# Patient Record
Sex: FEMALE | Race: WHITE | NOT HISPANIC OR LATINO | Employment: FULL TIME | URBAN - METROPOLITAN AREA
[De-identification: names, ages, dates, MRNs, and addresses within clinical notes are randomized per-mention and may not be internally consistent; named-entity substitution may affect disease eponyms.]

---

## 2023-09-18 ENCOUNTER — TELEPHONE (OUTPATIENT)
Dept: PRIMARY CARE | Facility: CLINIC | Age: 68
End: 2023-09-18
Payer: COMMERCIAL

## 2023-09-18 DIAGNOSIS — U07.1 COVID-19: Primary | ICD-10-CM

## 2023-09-18 NOTE — TELEPHONE ENCOUNTER
Tested for COVID-19. Wishes to do paxlovid. She knows to hold crestor for 5 days after stopping med. She is aware of EUA and side effects of meds.  Call if worsening or not feeling better

## 2023-10-04 ENCOUNTER — PHARMACY VISIT (OUTPATIENT)
Dept: PHARMACY | Facility: CLINIC | Age: 68
End: 2023-10-04
Payer: COMMERCIAL

## 2023-10-04 PROCEDURE — RXMED WILLOW AMBULATORY MEDICATION CHARGE

## 2023-10-04 RX ORDER — LOSARTAN POTASSIUM 25 MG/1
25 TABLET ORAL 2 TIMES DAILY
Qty: 180 TABLET | Refills: 1 | OUTPATIENT
Start: 2022-12-08 | End: 2023-12-31 | Stop reason: SDUPTHER

## 2023-10-05 ENCOUNTER — PHARMACY VISIT (OUTPATIENT)
Dept: PHARMACY | Facility: CLINIC | Age: 68
End: 2023-10-05
Payer: COMMERCIAL

## 2023-10-05 PROCEDURE — RXMED WILLOW AMBULATORY MEDICATION CHARGE

## 2023-10-23 ENCOUNTER — PHARMACY VISIT (OUTPATIENT)
Dept: PHARMACY | Facility: CLINIC | Age: 68
End: 2023-10-23
Payer: COMMERCIAL

## 2023-10-23 PROCEDURE — RXMED WILLOW AMBULATORY MEDICATION CHARGE

## 2023-11-10 ENCOUNTER — PHARMACY VISIT (OUTPATIENT)
Dept: PHARMACY | Facility: CLINIC | Age: 68
End: 2023-11-10
Payer: COMMERCIAL

## 2023-11-10 ENCOUNTER — TELEPHONE (OUTPATIENT)
Dept: PRIMARY CARE | Facility: CLINIC | Age: 68
End: 2023-11-10
Payer: COMMERCIAL

## 2023-11-10 DIAGNOSIS — Z00.00 HEALTH CARE MAINTENANCE: Primary | ICD-10-CM

## 2023-11-10 PROBLEM — H52.203 ASTIGMATISM OF BOTH EYES: Status: ACTIVE | Noted: 2023-11-10

## 2023-11-10 PROBLEM — R09.81 NASAL CONGESTION: Status: ACTIVE | Noted: 2023-11-10

## 2023-11-10 PROBLEM — H35.033 HYPERTENSIVE RETINOPATHY OF BOTH EYES, GRADE 1: Status: ACTIVE | Noted: 2023-11-10

## 2023-11-10 PROBLEM — E55.9 VITAMIN D DEFICIENCY: Status: ACTIVE | Noted: 2023-11-10

## 2023-11-10 PROBLEM — J30.9 ALLERGIC RHINITIS: Status: ACTIVE | Noted: 2023-11-10

## 2023-11-10 PROBLEM — E78.5 DYSLIPIDEMIA: Status: ACTIVE | Noted: 2023-11-10

## 2023-11-10 PROBLEM — H43.811 POSTERIOR VITREOUS DETACHMENT, RIGHT EYE: Status: ACTIVE | Noted: 2023-11-10

## 2023-11-10 PROBLEM — M25.579 ANKLE PAIN: Status: ACTIVE | Noted: 2023-11-10

## 2023-11-10 PROBLEM — M79.89 MASS OF SOFT TISSUE OF THIGH: Status: ACTIVE | Noted: 2023-11-10

## 2023-11-10 PROBLEM — J02.9 SORE THROAT: Status: ACTIVE | Noted: 2023-11-10

## 2023-11-10 PROBLEM — H10.9 CONJUNCTIVITIS: Status: ACTIVE | Noted: 2023-11-10

## 2023-11-10 PROBLEM — M85.80 OSTEOPENIA: Status: ACTIVE | Noted: 2023-11-10

## 2023-11-10 PROBLEM — S82.831K: Status: ACTIVE | Noted: 2023-11-10

## 2023-11-10 PROBLEM — Z98.890 S/P YAG CAPSULOTOMY, RIGHT: Status: ACTIVE | Noted: 2023-11-10

## 2023-11-10 PROBLEM — R73.09 ELEVATED GLUCOSE: Status: ACTIVE | Noted: 2023-11-10

## 2023-11-10 PROBLEM — R07.9 CHEST PAIN: Status: ACTIVE | Noted: 2023-11-10

## 2023-11-10 PROBLEM — I10 ESSENTIAL HYPERTENSION: Status: ACTIVE | Noted: 2023-11-10

## 2023-11-10 PROBLEM — H25.812 COMBINED FORM OF AGE-RELATED CATARACT, LEFT EYE: Status: ACTIVE | Noted: 2023-11-10

## 2023-11-10 PROBLEM — H11.013: Status: ACTIVE | Noted: 2023-11-10

## 2023-11-10 PROBLEM — B35.1 ONYCHOMYCOSIS OF TOENAIL: Status: ACTIVE | Noted: 2023-11-10

## 2023-11-10 PROBLEM — M79.673 FOOT PAIN: Status: ACTIVE | Noted: 2023-11-10

## 2023-11-10 PROBLEM — B00.1 COLD SORE: Status: ACTIVE | Noted: 2023-11-10

## 2023-11-10 PROBLEM — N95.2 ATROPHIC VAGINITIS: Status: ACTIVE | Noted: 2023-11-10

## 2023-11-10 PROBLEM — B49 FUNGAL INFECTION: Status: ACTIVE | Noted: 2023-11-10

## 2023-11-10 PROBLEM — M79.10 MYALGIA: Status: ACTIVE | Noted: 2023-11-10

## 2023-11-10 PROBLEM — H04.129 DRY EYE: Status: ACTIVE | Noted: 2023-11-10

## 2023-11-10 PROBLEM — Z20.822 SUSPECTED 2019 NOVEL CORONAVIRUS INFECTION: Status: ACTIVE | Noted: 2023-11-10

## 2023-11-10 PROCEDURE — RXMED WILLOW AMBULATORY MEDICATION CHARGE

## 2023-11-10 RX ORDER — RESPIRATORY SYNCYTIAL VISUS VACCINE RECOMBINANT, ADJUVANTED 120MCG/0.5
0.5 KIT INTRAMUSCULAR ONCE
Qty: 0.5 ML | Refills: 0 | Status: SHIPPED | OUTPATIENT
Start: 2023-11-10 | End: 2023-11-10

## 2023-12-07 ENCOUNTER — OFFICE VISIT (OUTPATIENT)
Dept: OPHTHALMOLOGY | Facility: CLINIC | Age: 68
End: 2023-12-07
Payer: COMMERCIAL

## 2023-12-07 DIAGNOSIS — H25.812 COMBINED FORM OF AGE-RELATED CATARACT, LEFT EYE: ICD-10-CM

## 2023-12-07 DIAGNOSIS — H11.013: Primary | ICD-10-CM

## 2023-12-07 DIAGNOSIS — H52.4 PRESBYOPIA OF BOTH EYES: ICD-10-CM

## 2023-12-07 DIAGNOSIS — H52.221 REGULAR ASTIGMATISM OF RIGHT EYE: ICD-10-CM

## 2023-12-07 DIAGNOSIS — Z96.1 PSEUDOPHAKIA, RIGHT EYE: ICD-10-CM

## 2023-12-07 DIAGNOSIS — H43.811 POSTERIOR VITREOUS DETACHMENT, RIGHT EYE: ICD-10-CM

## 2023-12-07 DIAGNOSIS — H35.033 HYPERTENSIVE RETINOPATHY OF BOTH EYES, GRADE 1: ICD-10-CM

## 2023-12-07 DIAGNOSIS — H52.02 HYPEROPIA, LEFT: ICD-10-CM

## 2023-12-07 PROCEDURE — 92014 COMPRE OPH EXAM EST PT 1/>: CPT | Performed by: OPTOMETRIST

## 2023-12-07 PROCEDURE — 92015 DETERMINE REFRACTIVE STATE: CPT | Performed by: OPTOMETRIST

## 2023-12-07 RX ORDER — LYSINE HCL 500 MG
TABLET ORAL
COMMUNITY
Start: 2022-08-11

## 2023-12-07 RX ORDER — ESTRADIOL 10 UG/1
TABLET VAGINAL
COMMUNITY
Start: 2023-09-12 | End: 2024-02-01 | Stop reason: SDUPTHER

## 2023-12-07 RX ORDER — LEVOCETIRIZINE DIHYDROCHLORIDE 5 MG/1
1 TABLET, FILM COATED ORAL EVERY EVENING
COMMUNITY
Start: 2022-08-11

## 2023-12-07 ASSESSMENT — SLIT LAMP EXAM - LIDS
COMMENTS: NORMAL
COMMENTS: NORMAL

## 2023-12-07 ASSESSMENT — ENCOUNTER SYMPTOMS
ENDOCRINE NEGATIVE: 0
RESPIRATORY NEGATIVE: 0
MUSCULOSKELETAL NEGATIVE: 0
ALLERGIC/IMMUNOLOGIC NEGATIVE: 0
NEUROLOGICAL NEGATIVE: 0
HEMATOLOGIC/LYMPHATIC NEGATIVE: 0
EYES NEGATIVE: 1
GASTROINTESTINAL NEGATIVE: 0
PSYCHIATRIC NEGATIVE: 0
CARDIOVASCULAR NEGATIVE: 0
CONSTITUTIONAL NEGATIVE: 0

## 2023-12-07 ASSESSMENT — CONF VISUAL FIELD
OD_INFERIOR_TEMPORAL_RESTRICTION: 0
OS_SUPERIOR_TEMPORAL_RESTRICTION: 0
OD_SUPERIOR_TEMPORAL_RESTRICTION: 0
OD_SUPERIOR_NASAL_RESTRICTION: 0
OD_INFERIOR_NASAL_RESTRICTION: 0
OS_NORMAL: 1
METHOD: COUNTING FINGERS
OD_NORMAL: 1
OS_INFERIOR_TEMPORAL_RESTRICTION: 0
OS_INFERIOR_NASAL_RESTRICTION: 0
OS_SUPERIOR_NASAL_RESTRICTION: 0

## 2023-12-07 ASSESSMENT — REFRACTION_MANIFEST
OS_ADD: +2.50
OS_SPHERE: +1.50
OS_AXIS: 095
OD_CYLINDER: -0.75
OS_CYLINDER: -0.50
OD_AXIS: 140
OD_ADD: +2.50
OD_SPHERE: -0.25

## 2023-12-07 ASSESSMENT — EXTERNAL EXAM - RIGHT EYE: OD_EXAM: NORMAL

## 2023-12-07 ASSESSMENT — VISUAL ACUITY
OD_CC: 20/20
OS_CC: 20/30
OS_CC+: -1
CORRECTION_TYPE: GLASSES
OS_BAT_MED: 20/30-2
METHOD: SNELLEN - LINEAR

## 2023-12-07 ASSESSMENT — CUP TO DISC RATIO
OS_RATIO: 0.3
OD_RATIO: 0.3

## 2023-12-07 ASSESSMENT — REFRACTION_WEARINGRX
OD_ADD: +2.50
OD_CYLINDER: -0.75
OS_SPHERE: +0.75
OD_AXIS: 135
OS_ADD: +2.50
SPECS_TYPE: PAL
OD_SPHERE: -0.25
OS_CYLINDER: DS

## 2023-12-07 ASSESSMENT — PACHYMETRY
OS_CT(UM): 572
OD_CT(UM): 567

## 2023-12-07 ASSESSMENT — TONOMETRY
IOP_METHOD: GOLDMANN APPLANATION
OS_IOP_MMHG: 18
OD_IOP_MMHG: 18

## 2023-12-07 ASSESSMENT — EXTERNAL EXAM - LEFT EYE: OS_EXAM: NORMAL

## 2023-12-07 NOTE — PROGRESS NOTES
Longstanding PSC OS and VA 20/25 and stable as well as refraction stable.  Will monitor annually.  A spectacle prescription was given at the patient`s request. Minimal change  PVD OD and no changes.  Dx of HTN and grade I retinopathy.  Pterygium OD and stable. RTc annually or The patient was asked to return to our clinic or seek out eye care ASAP if new flashes of light or floaters are noted.

## 2023-12-31 DIAGNOSIS — E78.5 HYPERLIPIDEMIA, UNSPECIFIED HYPERLIPIDEMIA TYPE: ICD-10-CM

## 2023-12-31 DIAGNOSIS — I10 ESSENTIAL HYPERTENSION: Primary | ICD-10-CM

## 2024-01-02 ENCOUNTER — PHARMACY VISIT (OUTPATIENT)
Dept: PHARMACY | Facility: CLINIC | Age: 69
End: 2024-01-02
Payer: COMMERCIAL

## 2024-01-02 PROCEDURE — RXMED WILLOW AMBULATORY MEDICATION CHARGE

## 2024-01-02 RX ORDER — ROSUVASTATIN CALCIUM 5 MG/1
5 TABLET, COATED ORAL DAILY
Qty: 90 TABLET | Refills: 3 | Status: SHIPPED | OUTPATIENT
Start: 2024-01-02 | End: 2024-12-31

## 2024-01-02 RX ORDER — LOSARTAN POTASSIUM 25 MG/1
25 TABLET ORAL 2 TIMES DAILY
Qty: 180 TABLET | Refills: 3 | Status: SHIPPED | OUTPATIENT
Start: 2024-01-02

## 2024-01-03 ENCOUNTER — PHARMACY VISIT (OUTPATIENT)
Dept: PHARMACY | Facility: CLINIC | Age: 69
End: 2024-01-03

## 2024-01-25 ASSESSMENT — PROMIS GLOBAL HEALTH SCALE
EMOTIONAL_PROBLEMS: NEVER
RATE_MENTAL_HEALTH: EXCELLENT
RATE_PHYSICAL_HEALTH: VERY GOOD
CARRYOUT_SOCIAL_ACTIVITIES: EXCELLENT
CARRYOUT_PHYSICAL_ACTIVITIES: COMPLETELY
RATE_GENERAL_HEALTH: VERY GOOD
RATE_SOCIAL_SATISFACTION: VERY GOOD
RATE_AVERAGE_PAIN: 0
RATE_QUALITY_OF_LIFE: VERY GOOD

## 2024-02-01 ENCOUNTER — OFFICE VISIT (OUTPATIENT)
Dept: PRIMARY CARE | Facility: CLINIC | Age: 69
End: 2024-02-01
Payer: COMMERCIAL

## 2024-02-01 VITALS
TEMPERATURE: 97.5 F | WEIGHT: 157.2 LBS | DIASTOLIC BLOOD PRESSURE: 65 MMHG | BODY MASS INDEX: 26.84 KG/M2 | OXYGEN SATURATION: 97 % | HEART RATE: 99 BPM | HEIGHT: 64 IN | RESPIRATION RATE: 16 BRPM | SYSTOLIC BLOOD PRESSURE: 125 MMHG

## 2024-02-01 DIAGNOSIS — E55.9 VITAMIN D DEFICIENCY: ICD-10-CM

## 2024-02-01 DIAGNOSIS — N89.8 VAGINAL DRYNESS: ICD-10-CM

## 2024-02-01 DIAGNOSIS — R22.41 MASS OF LOWER EXTREMITY, RIGHT: ICD-10-CM

## 2024-02-01 DIAGNOSIS — I10 ESSENTIAL HYPERTENSION: ICD-10-CM

## 2024-02-01 DIAGNOSIS — Z00.00 HEALTHCARE MAINTENANCE: Primary | ICD-10-CM

## 2024-02-01 DIAGNOSIS — Z12.31 ENCOUNTER FOR SCREENING MAMMOGRAM FOR MALIGNANT NEOPLASM OF BREAST: ICD-10-CM

## 2024-02-01 DIAGNOSIS — R73.09 ELEVATED GLUCOSE: ICD-10-CM

## 2024-02-01 DIAGNOSIS — M85.80 OSTEOPENIA, UNSPECIFIED LOCATION: ICD-10-CM

## 2024-02-01 PROBLEM — H10.9 CONJUNCTIVITIS: Status: RESOLVED | Noted: 2023-11-10 | Resolved: 2024-02-01

## 2024-02-01 PROBLEM — J02.9 SORE THROAT: Status: RESOLVED | Noted: 2023-11-10 | Resolved: 2024-02-01

## 2024-02-01 PROBLEM — R09.81 NASAL CONGESTION: Status: RESOLVED | Noted: 2023-11-10 | Resolved: 2024-02-01

## 2024-02-01 PROBLEM — B49 FUNGAL INFECTION: Status: RESOLVED | Noted: 2023-11-10 | Resolved: 2024-02-01

## 2024-02-01 PROBLEM — M79.673 FOOT PAIN: Status: RESOLVED | Noted: 2023-11-10 | Resolved: 2024-02-01

## 2024-02-01 PROBLEM — Z20.822 SUSPECTED 2019 NOVEL CORONAVIRUS INFECTION: Status: RESOLVED | Noted: 2023-11-10 | Resolved: 2024-02-01

## 2024-02-01 PROBLEM — R07.9 CHEST PAIN: Status: RESOLVED | Noted: 2023-11-10 | Resolved: 2024-02-01

## 2024-02-01 PROBLEM — M25.579 ANKLE PAIN: Status: RESOLVED | Noted: 2023-11-10 | Resolved: 2024-02-01

## 2024-02-01 PROCEDURE — 1036F TOBACCO NON-USER: CPT | Performed by: INTERNAL MEDICINE

## 2024-02-01 PROCEDURE — 99397 PER PM REEVAL EST PAT 65+ YR: CPT | Performed by: INTERNAL MEDICINE

## 2024-02-01 PROCEDURE — 1126F AMNT PAIN NOTED NONE PRSNT: CPT | Performed by: INTERNAL MEDICINE

## 2024-02-01 PROCEDURE — 1160F RVW MEDS BY RX/DR IN RCRD: CPT | Performed by: INTERNAL MEDICINE

## 2024-02-01 PROCEDURE — 3078F DIAST BP <80 MM HG: CPT | Performed by: INTERNAL MEDICINE

## 2024-02-01 PROCEDURE — 3074F SYST BP LT 130 MM HG: CPT | Performed by: INTERNAL MEDICINE

## 2024-02-01 PROCEDURE — 1159F MED LIST DOCD IN RCRD: CPT | Performed by: INTERNAL MEDICINE

## 2024-02-01 RX ORDER — ESTRADIOL 10 UG/1
TABLET VAGINAL
Qty: 12 TABLET | Refills: 3 | Status: SHIPPED | OUTPATIENT
Start: 2024-02-01 | End: 2024-03-11

## 2024-02-01 ASSESSMENT — ENCOUNTER SYMPTOMS
EYE PAIN: 0
CHILLS: 0
NERVOUS/ANXIOUS: 0
WOUND: 0
POLYDIPSIA: 0
CHEST TIGHTNESS: 0
POLYPHAGIA: 0
FEVER: 0
WHEEZING: 0
DIARRHEA: 0
DYSURIA: 0
VOMITING: 0
FREQUENCY: 0
PALPITATIONS: 0
NAUSEA: 0
HEMATURIA: 0
MYALGIAS: 0
COUGH: 0
UNEXPECTED WEIGHT CHANGE: 0
BLOOD IN STOOL: 0
RHINORRHEA: 0
ARTHRALGIAS: 0
DYSPHORIC MOOD: 0
CONSTIPATION: 0
SORE THROAT: 0
SHORTNESS OF BREATH: 0
HEADACHES: 0
ABDOMINAL PAIN: 0
DIZZINESS: 0

## 2024-02-01 ASSESSMENT — PATIENT HEALTH QUESTIONNAIRE - PHQ9
SUM OF ALL RESPONSES TO PHQ9 QUESTIONS 1 AND 2: 0
1. LITTLE INTEREST OR PLEASURE IN DOING THINGS: NOT AT ALL
2. FEELING DOWN, DEPRESSED OR HOPELESS: NOT AT ALL

## 2024-02-01 NOTE — PROGRESS NOTES
"Subjective   Patient ID: Lary Stein is a 69 y.o. female who presents for Annual Exam.    HPI     Here for annual  Sees dentist and eye doc regularly  Exercises  Watches diet  Recovered from fracture  Sees derm annually for skin checks  Due for labs  She has lump on right buttock x 10 months. Not getting bigger. Would like ultrasound. Saw gen surgery but does not want removed if does not have to    Had COVID and all recovered    Review of Systems   Constitutional:  Negative for chills, fever and unexpected weight change.   HENT:  Negative for congestion, hearing loss, rhinorrhea and sore throat.    Eyes:  Negative for pain and visual disturbance.   Respiratory:  Negative for cough, chest tightness, shortness of breath and wheezing.    Cardiovascular:  Negative for chest pain and palpitations.   Gastrointestinal:  Negative for abdominal pain, blood in stool, constipation, diarrhea, nausea and vomiting.   Endocrine: Negative for cold intolerance, heat intolerance, polydipsia and polyphagia.   Genitourinary:  Negative for dysuria, frequency and hematuria.   Musculoskeletal:  Negative for arthralgias and myalgias.   Skin:  Negative for rash and wound.   Neurological:  Negative for dizziness, syncope and headaches.   Psychiatric/Behavioral:  Negative for dysphoric mood. The patient is not nervous/anxious.        Objective   /65 Comment: home monitor  Pulse 99   Temp 36.4 °C (97.5 °F)   Resp 16   Ht 1.613 m (5' 3.5\")   Wt 71.3 kg (157 lb 3.2 oz)   SpO2 97%   BMI 27.41 kg/m²     Physical Exam  Vitals reviewed.   Constitutional:       Appearance: Normal appearance. She is not ill-appearing.   HENT:      Head: Normocephalic and atraumatic.      Right Ear: Tympanic membrane normal.      Left Ear: Tympanic membrane normal.      Nose: Nose normal.      Mouth/Throat:      Mouth: Mucous membranes are moist.      Pharynx: Oropharynx is clear.   Eyes:      Extraocular Movements: Extraocular movements intact.      " Conjunctiva/sclera: Conjunctivae normal.      Pupils: Pupils are equal, round, and reactive to light.   Cardiovascular:      Rate and Rhythm: Normal rate and regular rhythm.   Pulmonary:      Effort: Pulmonary effort is normal.      Breath sounds: Normal breath sounds. No wheezing.   Abdominal:      General: There is no distension.      Palpations: Abdomen is soft. There is no mass.      Tenderness: There is no abdominal tenderness.   Musculoskeletal:         General: No swelling.      Cervical back: Neck supple.      Comments: Right buttock- golf sized smooth round lump   Lymphadenopathy:      Cervical: No cervical adenopathy.   Neurological:      General: No focal deficit present.      Mental Status: She is alert and oriented to person, place, and time.      Gait: Gait normal.   Psychiatric:         Mood and Affect: Mood normal.         Behavior: Behavior normal.         Thought Content: Thought content normal.         Assessment/Plan   Problem List Items Addressed This Visit             ICD-10-CM    Elevated glucose R73.09    Relevant Orders    Hemoglobin A1C    Essential hypertension I10    Osteopenia M85.80    Relevant Orders    XR DEXA bone density    Vitamin D deficiency E55.9    Relevant Orders    Vitamin D 25-Hydroxy,Total (for eval of Vitamin D levels)     Other Visit Diagnoses         Codes    Healthcare maintenance    -  Primary Z00.00    Relevant Orders    CBC    Comprehensive Metabolic Panel    Lipid Panel    Encounter for screening mammogram for malignant neoplasm of breast     Z12.31    Relevant Orders    BI mammo bilateral screening tomosynthesis    Vaginal dryness     N89.8    Relevant Medications    Yuvafem 10 mcg tablet vaginal tablet    Mass of lower extremity, right     R22.41    Relevant Orders    US MSK lower extremity joints tendons muscles             CPE completed.  Advised to keep a heart healthy, low fat diet with fruits and veggies like Mediterranean diet.  Advised on the importance of  exercise and maintaining 150 minutes of exercise per week (30 minutes per day 5 days a week).  Advised on regular eye and dental visits.  Discussed age appropriate cancer screening, immunizations and recommendations given.  Discussed avoiding illicit drugs and tobacco. Advised on appropriate use of alcohol.  Advised to wear seat belt.    Right buttock mass- check ultrasound     HTN: controlled on Losartan  -has white coat, brought log     Fracture of right fibula: seeing ortho  -treated for osteopenia due to this     ELevated glucose: a1c 6.4, watch sugars  -strong fam hx     Hyperlipidemia: on crestor and controlled     Osteopenia: 6 months forteo, on Risedronate    Check labs, dexa in June and mammo. Send for ultrasound     Health Maintenance  -Pap smear: normal, >65, no longer needs  -Vaccinations: UTD COVID, shingrix, tdap, flu, prevnar-20  -Mammogram: 2/23, ordered  -Colonoscopy : 3/21-10 years  -DEXA: 6/22-worsening osteopenia

## 2024-02-08 ENCOUNTER — PATIENT MESSAGE (OUTPATIENT)
Dept: PRIMARY CARE | Facility: CLINIC | Age: 69
End: 2024-02-08
Payer: COMMERCIAL

## 2024-02-08 ENCOUNTER — APPOINTMENT (OUTPATIENT)
Dept: RADIOLOGY | Facility: HOSPITAL | Age: 69
End: 2024-02-08
Payer: COMMERCIAL

## 2024-02-08 DIAGNOSIS — R53.83 OTHER FATIGUE: ICD-10-CM

## 2024-02-08 DIAGNOSIS — Z00.00 HEALTHCARE MAINTENANCE: ICD-10-CM

## 2024-02-08 DIAGNOSIS — R22.41 MASS OF LOWER EXTREMITY, RIGHT: Primary | ICD-10-CM

## 2024-02-12 ENCOUNTER — PHARMACY VISIT (OUTPATIENT)
Dept: PHARMACY | Facility: CLINIC | Age: 69
End: 2024-02-12
Payer: COMMERCIAL

## 2024-02-12 PROCEDURE — RXMED WILLOW AMBULATORY MEDICATION CHARGE

## 2024-02-15 ENCOUNTER — HOSPITAL ENCOUNTER (OUTPATIENT)
Dept: RADIOLOGY | Facility: HOSPITAL | Age: 69
Discharge: HOME | End: 2024-02-15
Payer: COMMERCIAL

## 2024-02-15 DIAGNOSIS — R22.41 MASS OF LOWER EXTREMITY, RIGHT: ICD-10-CM

## 2024-02-15 PROCEDURE — 76881 US COMPL JOINT R-T W/IMG: CPT

## 2024-02-29 ENCOUNTER — HOSPITAL ENCOUNTER (OUTPATIENT)
Dept: RADIOLOGY | Facility: CLINIC | Age: 69
Discharge: HOME | End: 2024-02-29
Payer: COMMERCIAL

## 2024-02-29 ENCOUNTER — LAB (OUTPATIENT)
Dept: LAB | Facility: LAB | Age: 69
End: 2024-02-29
Payer: COMMERCIAL

## 2024-02-29 VITALS — BODY MASS INDEX: 26.84 KG/M2 | HEIGHT: 64 IN | WEIGHT: 157.19 LBS

## 2024-02-29 DIAGNOSIS — R73.09 ELEVATED GLUCOSE: ICD-10-CM

## 2024-02-29 DIAGNOSIS — Z00.00 HEALTHCARE MAINTENANCE: ICD-10-CM

## 2024-02-29 DIAGNOSIS — E55.9 VITAMIN D DEFICIENCY: ICD-10-CM

## 2024-02-29 DIAGNOSIS — R53.83 OTHER FATIGUE: ICD-10-CM

## 2024-02-29 DIAGNOSIS — Z12.31 ENCOUNTER FOR SCREENING MAMMOGRAM FOR MALIGNANT NEOPLASM OF BREAST: ICD-10-CM

## 2024-02-29 LAB
25(OH)D3 SERPL-MCNC: 53 NG/ML (ref 30–100)
ALBUMIN SERPL BCP-MCNC: 4.4 G/DL (ref 3.4–5)
ALP SERPL-CCNC: 55 U/L (ref 33–136)
ALT SERPL W P-5'-P-CCNC: 16 U/L (ref 7–45)
ANION GAP SERPL CALC-SCNC: 12 MMOL/L (ref 10–20)
AST SERPL W P-5'-P-CCNC: 18 U/L (ref 9–39)
BILIRUB SERPL-MCNC: 0.6 MG/DL (ref 0–1.2)
BUN SERPL-MCNC: 12 MG/DL (ref 6–23)
CALCIUM SERPL-MCNC: 9.6 MG/DL (ref 8.6–10.3)
CHLORIDE SERPL-SCNC: 106 MMOL/L (ref 98–107)
CHOLEST SERPL-MCNC: 177 MG/DL (ref 0–199)
CHOLESTEROL/HDL RATIO: 3.1
CO2 SERPL-SCNC: 28 MMOL/L (ref 21–32)
CREAT SERPL-MCNC: 0.87 MG/DL (ref 0.5–1.05)
EGFRCR SERPLBLD CKD-EPI 2021: 73 ML/MIN/1.73M*2
ERYTHROCYTE [DISTWIDTH] IN BLOOD BY AUTOMATED COUNT: 13.5 % (ref 11.5–14.5)
EST. AVERAGE GLUCOSE BLD GHB EST-MCNC: 134 MG/DL
GLUCOSE SERPL-MCNC: 111 MG/DL (ref 74–99)
HBA1C MFR BLD: 6.3 %
HCT VFR BLD AUTO: 42.6 % (ref 36–46)
HDLC SERPL-MCNC: 57.1 MG/DL
HGB BLD-MCNC: 14.1 G/DL (ref 12–16)
LDLC SERPL CALC-MCNC: 87 MG/DL
MCH RBC QN AUTO: 30.6 PG (ref 26–34)
MCHC RBC AUTO-ENTMCNC: 33.1 G/DL (ref 32–36)
MCV RBC AUTO: 92 FL (ref 80–100)
NON HDL CHOLESTEROL: 120 MG/DL (ref 0–149)
NRBC BLD-RTO: 0 /100 WBCS (ref 0–0)
PLATELET # BLD AUTO: 222 X10*3/UL (ref 150–450)
POTASSIUM SERPL-SCNC: 4.5 MMOL/L (ref 3.5–5.3)
PROT SERPL-MCNC: 6.8 G/DL (ref 6.4–8.2)
RBC # BLD AUTO: 4.61 X10*6/UL (ref 4–5.2)
SODIUM SERPL-SCNC: 141 MMOL/L (ref 136–145)
TRIGL SERPL-MCNC: 166 MG/DL (ref 0–149)
TSH SERPL-ACNC: 1.32 MIU/L (ref 0.44–3.98)
VLDL: 33 MG/DL (ref 0–40)
WBC # BLD AUTO: 4.7 X10*3/UL (ref 4.4–11.3)

## 2024-02-29 PROCEDURE — 36415 COLL VENOUS BLD VENIPUNCTURE: CPT

## 2024-02-29 PROCEDURE — 80061 LIPID PANEL: CPT

## 2024-02-29 PROCEDURE — 85027 COMPLETE CBC AUTOMATED: CPT

## 2024-02-29 PROCEDURE — 77063 BREAST TOMOSYNTHESIS BI: CPT | Performed by: RADIOLOGY

## 2024-02-29 PROCEDURE — 84443 ASSAY THYROID STIM HORMONE: CPT

## 2024-02-29 PROCEDURE — 82306 VITAMIN D 25 HYDROXY: CPT

## 2024-02-29 PROCEDURE — 77067 SCR MAMMO BI INCL CAD: CPT

## 2024-02-29 PROCEDURE — 83036 HEMOGLOBIN GLYCOSYLATED A1C: CPT

## 2024-02-29 PROCEDURE — 77067 SCR MAMMO BI INCL CAD: CPT | Performed by: RADIOLOGY

## 2024-02-29 PROCEDURE — 80053 COMPREHEN METABOLIC PANEL: CPT

## 2024-03-10 DIAGNOSIS — N89.8 VAGINAL DRYNESS: ICD-10-CM

## 2024-03-11 RX ORDER — ESTRADIOL 10 UG/1
TABLET VAGINAL
Qty: 12 TABLET | Refills: 2 | Status: SHIPPED | OUTPATIENT
Start: 2024-03-11

## 2024-03-30 DIAGNOSIS — M85.80 OSTEOPENIA, UNSPECIFIED LOCATION: Primary | ICD-10-CM

## 2024-03-30 PROCEDURE — RXMED WILLOW AMBULATORY MEDICATION CHARGE

## 2024-04-01 ENCOUNTER — PHARMACY VISIT (OUTPATIENT)
Dept: PHARMACY | Facility: CLINIC | Age: 69
End: 2024-04-01
Payer: COMMERCIAL

## 2024-04-01 PROCEDURE — RXMED WILLOW AMBULATORY MEDICATION CHARGE

## 2024-04-01 RX ORDER — RISEDRONATE SODIUM 150 MG/1
TABLET, FILM COATED ORAL
Qty: 9 TABLET | Refills: 3 | Status: SHIPPED | OUTPATIENT
Start: 2024-04-01 | End: 2025-03-31

## 2024-04-02 ENCOUNTER — PHARMACY VISIT (OUTPATIENT)
Dept: PHARMACY | Facility: CLINIC | Age: 69
End: 2024-04-02
Payer: COMMERCIAL

## 2024-04-13 ENCOUNTER — PHARMACY VISIT (OUTPATIENT)
Dept: PHARMACY | Facility: CLINIC | Age: 69
End: 2024-04-13
Payer: COMMERCIAL

## 2024-04-13 PROCEDURE — RXMED WILLOW AMBULATORY MEDICATION CHARGE

## 2024-05-02 PROCEDURE — 88305 TISSUE EXAM BY PATHOLOGIST: CPT | Performed by: DERMATOLOGY

## 2024-05-02 PROCEDURE — 88342 IMHCHEM/IMCYTCHM 1ST ANTB: CPT | Performed by: DERMATOLOGY

## 2024-05-07 ENCOUNTER — LAB REQUISITION (OUTPATIENT)
Dept: DERMATOPATHOLOGY | Facility: CLINIC | Age: 69
End: 2024-05-07
Payer: COMMERCIAL

## 2024-05-07 DIAGNOSIS — L98.8 OTHER SPECIFIED DISORDERS OF THE SKIN AND SUBCUTANEOUS TISSUE: ICD-10-CM

## 2024-05-16 LAB
LAB AP ASR DISCLAIMER: NORMAL
LABORATORY COMMENT REPORT: NORMAL
PATH REPORT.FINAL DX SPEC: NORMAL
PATH REPORT.GROSS SPEC: NORMAL
PATH REPORT.RELEVANT HX SPEC: NORMAL
PATH REPORT.TOTAL CANCER: NORMAL

## 2024-07-07 ASSESSMENT — LIFESTYLE VARIABLES
CURRENT_SMOKER: N
3_OR_MORE_DRINKS_PER_DAY: N

## 2024-07-09 ENCOUNTER — PHARMACY VISIT (OUTPATIENT)
Dept: PHARMACY | Facility: CLINIC | Age: 69
End: 2024-07-09
Payer: COMMERCIAL

## 2024-07-09 PROCEDURE — RXMED WILLOW AMBULATORY MEDICATION CHARGE

## 2024-07-11 ENCOUNTER — HOSPITAL ENCOUNTER (OUTPATIENT)
Dept: RADIOLOGY | Facility: CLINIC | Age: 69
Discharge: HOME | End: 2024-07-11
Payer: COMMERCIAL

## 2024-07-11 DIAGNOSIS — M85.80 OSTEOPENIA, UNSPECIFIED LOCATION: ICD-10-CM

## 2024-07-11 PROCEDURE — 77080 DXA BONE DENSITY AXIAL: CPT

## 2024-07-12 PROCEDURE — RXMED WILLOW AMBULATORY MEDICATION CHARGE

## 2024-07-15 ENCOUNTER — PHARMACY VISIT (OUTPATIENT)
Dept: PHARMACY | Facility: CLINIC | Age: 69
End: 2024-07-15
Payer: COMMERCIAL

## 2024-10-03 DIAGNOSIS — B00.1 COLD SORE: Primary | ICD-10-CM

## 2024-10-03 RX ORDER — VALACYCLOVIR HYDROCHLORIDE 1 G/1
2000 TABLET, FILM COATED ORAL 2 TIMES DAILY
Qty: 4 TABLET | Refills: 0 | Status: SHIPPED | OUTPATIENT
Start: 2024-10-03 | End: 2024-10-04

## 2024-10-06 PROCEDURE — RXMED WILLOW AMBULATORY MEDICATION CHARGE

## 2024-10-07 ENCOUNTER — PHARMACY VISIT (OUTPATIENT)
Dept: PHARMACY | Facility: CLINIC | Age: 69
End: 2024-10-07
Payer: COMMERCIAL

## 2024-10-10 ENCOUNTER — PHARMACY VISIT (OUTPATIENT)
Dept: PHARMACY | Facility: CLINIC | Age: 69
End: 2024-10-10
Payer: COMMERCIAL

## 2024-11-07 ENCOUNTER — APPOINTMENT (OUTPATIENT)
Dept: OPHTHALMOLOGY | Facility: CLINIC | Age: 69
End: 2024-11-07
Payer: COMMERCIAL

## 2024-11-16 ENCOUNTER — APPOINTMENT (OUTPATIENT)
Dept: OPHTHALMOLOGY | Facility: CLINIC | Age: 69
End: 2024-11-16
Payer: COMMERCIAL

## 2024-11-16 DIAGNOSIS — H52.203 ASTIGMATISM OF BOTH EYES WITH PRESBYOPIA: Primary | ICD-10-CM

## 2024-11-16 DIAGNOSIS — H52.4 ASTIGMATISM OF BOTH EYES WITH PRESBYOPIA: Primary | ICD-10-CM

## 2024-11-16 PROCEDURE — 92015 DETERMINE REFRACTIVE STATE: CPT | Performed by: OPTOMETRIST

## 2024-11-16 PROCEDURE — 92014 COMPRE OPH EXAM EST PT 1/>: CPT | Performed by: OPTOMETRIST

## 2024-11-16 ASSESSMENT — CONF VISUAL FIELD
OD_SUPERIOR_NASAL_RESTRICTION: 0
OD_INFERIOR_NASAL_RESTRICTION: 0
OS_SUPERIOR_TEMPORAL_RESTRICTION: 0
OS_SUPERIOR_NASAL_RESTRICTION: 0
OS_INFERIOR_TEMPORAL_RESTRICTION: 0
OD_INFERIOR_TEMPORAL_RESTRICTION: 0
OD_SUPERIOR_TEMPORAL_RESTRICTION: 0
OS_NORMAL: 1
OS_INFERIOR_NASAL_RESTRICTION: 0
OD_NORMAL: 1

## 2024-11-16 ASSESSMENT — TONOMETRY
OD_IOP_MMHG: 18
IOP_METHOD: GOLDMANN APPLANATION
OS_IOP_MMHG: 18

## 2024-11-16 ASSESSMENT — ENCOUNTER SYMPTOMS
EYES NEGATIVE: 1
CARDIOVASCULAR NEGATIVE: 0
GASTROINTESTINAL NEGATIVE: 0
CONSTITUTIONAL NEGATIVE: 0
MUSCULOSKELETAL NEGATIVE: 0
PSYCHIATRIC NEGATIVE: 0
HEMATOLOGIC/LYMPHATIC NEGATIVE: 0
RESPIRATORY NEGATIVE: 0
ENDOCRINE NEGATIVE: 0
ALLERGIC/IMMUNOLOGIC NEGATIVE: 0
NEUROLOGICAL NEGATIVE: 0

## 2024-11-16 ASSESSMENT — REFRACTION_MANIFEST
OS_ADD: +2.50
OS_AXIS: 095
OS_CYLINDER: -0.75
OD_AXIS: 155
OD_ADD: +2.50
OD_CYLINDER: -0.75
OS_SPHERE: +1.50
OD_SPHERE: PLANO

## 2024-11-16 ASSESSMENT — VISUAL ACUITY
OD_CC: 20/20
OD_CC: J1+
OS_CC: 20/25
CORRECTION_TYPE: GLASSES
METHOD: SNELLEN - LINEAR
OS_CC: J1+

## 2024-11-16 ASSESSMENT — REFRACTION_WEARINGRX
OS_ADD: +2.50
OD_CYLINDER: -0.75
OS_SPHERE: +1.50
OD_ADD: +2.50
OD_AXIS: 140
OS_AXIS: 095
OS_CYLINDER: -0.50
OD_SPHERE: -0.25

## 2024-11-16 ASSESSMENT — PACHYMETRY
OD_CT(UM): 567
OS_CT(UM): 572

## 2024-11-16 ASSESSMENT — CUP TO DISC RATIO
OS_RATIO: 0.3
OD_RATIO: 0.3

## 2024-11-16 ASSESSMENT — EXTERNAL EXAM - RIGHT EYE: OD_EXAM: NORMAL

## 2024-11-16 ASSESSMENT — EXTERNAL EXAM - LEFT EYE: OS_EXAM: NORMAL

## 2024-11-16 ASSESSMENT — SLIT LAMP EXAM - LIDS
COMMENTS: NORMAL
COMMENTS: NORMAL

## 2024-12-01 DIAGNOSIS — N89.8 VAGINAL DRYNESS: ICD-10-CM

## 2024-12-02 RX ORDER — ESTRADIOL 10 UG/1
TABLET VAGINAL
Qty: 12 TABLET | Refills: 2 | Status: SHIPPED | OUTPATIENT
Start: 2024-12-02

## 2025-02-13 ENCOUNTER — APPOINTMENT (OUTPATIENT)
Dept: PRIMARY CARE | Facility: CLINIC | Age: 70
End: 2025-02-13
Payer: COMMERCIAL

## 2025-02-13 VITALS
HEIGHT: 63 IN | DIASTOLIC BLOOD PRESSURE: 68 MMHG | SYSTOLIC BLOOD PRESSURE: 110 MMHG | HEART RATE: 80 BPM | WEIGHT: 157.2 LBS | BODY MASS INDEX: 27.85 KG/M2 | OXYGEN SATURATION: 98 %

## 2025-02-13 DIAGNOSIS — Z12.31 ENCOUNTER FOR SCREENING MAMMOGRAM FOR MALIGNANT NEOPLASM OF BREAST: ICD-10-CM

## 2025-02-13 DIAGNOSIS — E55.9 VITAMIN D DEFICIENCY: ICD-10-CM

## 2025-02-13 DIAGNOSIS — I10 ESSENTIAL HYPERTENSION: ICD-10-CM

## 2025-02-13 DIAGNOSIS — R73.09 ELEVATED GLUCOSE: ICD-10-CM

## 2025-02-13 DIAGNOSIS — N89.8 VAGINAL DRYNESS: ICD-10-CM

## 2025-02-13 DIAGNOSIS — M85.80 OSTEOPENIA, UNSPECIFIED LOCATION: ICD-10-CM

## 2025-02-13 DIAGNOSIS — Z00.00 HEALTHCARE MAINTENANCE: Primary | ICD-10-CM

## 2025-02-13 DIAGNOSIS — E78.5 HYPERLIPIDEMIA, UNSPECIFIED HYPERLIPIDEMIA TYPE: ICD-10-CM

## 2025-02-13 PROCEDURE — 3078F DIAST BP <80 MM HG: CPT | Performed by: INTERNAL MEDICINE

## 2025-02-13 PROCEDURE — 99397 PER PM REEVAL EST PAT 65+ YR: CPT | Performed by: INTERNAL MEDICINE

## 2025-02-13 PROCEDURE — 1160F RVW MEDS BY RX/DR IN RCRD: CPT | Performed by: INTERNAL MEDICINE

## 2025-02-13 PROCEDURE — 1036F TOBACCO NON-USER: CPT | Performed by: INTERNAL MEDICINE

## 2025-02-13 PROCEDURE — 3008F BODY MASS INDEX DOCD: CPT | Performed by: INTERNAL MEDICINE

## 2025-02-13 PROCEDURE — 1159F MED LIST DOCD IN RCRD: CPT | Performed by: INTERNAL MEDICINE

## 2025-02-13 PROCEDURE — RXMED WILLOW AMBULATORY MEDICATION CHARGE

## 2025-02-13 PROCEDURE — 3074F SYST BP LT 130 MM HG: CPT | Performed by: INTERNAL MEDICINE

## 2025-02-13 RX ORDER — ESTRADIOL 10 UG/1
INSERT VAGINAL
Qty: 12 TABLET | Refills: 2 | Status: SHIPPED | OUTPATIENT
Start: 2025-02-13

## 2025-02-13 RX ORDER — ROSUVASTATIN CALCIUM 5 MG/1
5 TABLET, COATED ORAL DAILY
Qty: 90 TABLET | Refills: 3 | Status: SHIPPED | OUTPATIENT
Start: 2025-02-13 | End: 2026-02-12

## 2025-02-13 RX ORDER — RISEDRONATE SODIUM 150 MG/1
150 TABLET, FILM COATED ORAL
Qty: 9 TABLET | Refills: 3 | Status: SHIPPED | OUTPATIENT
Start: 2025-02-13 | End: 2026-02-12

## 2025-02-13 RX ORDER — LOSARTAN POTASSIUM 25 MG/1
25 TABLET ORAL 2 TIMES DAILY
Qty: 180 TABLET | Refills: 3 | Status: SHIPPED | OUTPATIENT
Start: 2025-02-13

## 2025-02-13 ASSESSMENT — ENCOUNTER SYMPTOMS
NAUSEA: 0
HEMATURIA: 0
CHEST TIGHTNESS: 0
NERVOUS/ANXIOUS: 0
FREQUENCY: 0
DIZZINESS: 0
HEADACHES: 0
WHEEZING: 0
SHORTNESS OF BREATH: 0
COUGH: 0
RHINORRHEA: 0
ARTHRALGIAS: 0
FEVER: 0
PALPITATIONS: 0
CONSTIPATION: 0
POLYPHAGIA: 0
DIARRHEA: 0
UNEXPECTED WEIGHT CHANGE: 0
SORE THROAT: 0
ABDOMINAL PAIN: 0
POLYDIPSIA: 0
MYALGIAS: 0
CHILLS: 0
EYE PAIN: 0
VOMITING: 0
DYSPHORIC MOOD: 0
BLOOD IN STOOL: 0
DYSURIA: 0
WOUND: 0

## 2025-02-13 ASSESSMENT — PATIENT HEALTH QUESTIONNAIRE - PHQ9
1. LITTLE INTEREST OR PLEASURE IN DOING THINGS: NOT AT ALL
SUM OF ALL RESPONSES TO PHQ9 QUESTIONS 1 AND 2: 0
2. FEELING DOWN, DEPRESSED OR HOPELESS: NOT AT ALL

## 2025-02-13 NOTE — PROGRESS NOTES
"Subjective   Patient ID: Lary Stein is a 70 y.o. female who presents for Annual Exam.    HPI     Dental visits- UTD  Eye visits-UTD  Exercise- 3 days a week- bikes/treadmill and weights  Diet-healthy    Gets skins checks    States does not always need Losartan bid  BP this AM was 110/68    She overall feels good  Ankle does not bother her    Review of Systems   Constitutional:  Negative for chills, fever and unexpected weight change.   HENT:  Negative for congestion, hearing loss, rhinorrhea and sore throat.    Eyes:  Negative for pain and visual disturbance.   Respiratory:  Negative for cough, chest tightness, shortness of breath and wheezing.    Cardiovascular:  Negative for chest pain and palpitations.   Gastrointestinal:  Negative for abdominal pain, blood in stool, constipation, diarrhea, nausea and vomiting.   Endocrine: Negative for cold intolerance, heat intolerance, polydipsia and polyphagia.   Genitourinary:  Negative for dysuria, frequency and hematuria.   Musculoskeletal:  Negative for arthralgias and myalgias.   Skin:  Negative for rash and wound.   Neurological:  Negative for dizziness, syncope and headaches.   Psychiatric/Behavioral:  Negative for dysphoric mood. The patient is not nervous/anxious.        Objective   /68   Pulse 80   Ht 1.607 m (5' 3.25\")   Wt 71.3 kg (157 lb 3.2 oz)   SpO2 98%   BMI 27.63 kg/m²     Physical Exam  Vitals reviewed.   Constitutional:       Appearance: Normal appearance. She is not ill-appearing.   HENT:      Head: Normocephalic and atraumatic.      Right Ear: Tympanic membrane normal.      Left Ear: Tympanic membrane normal.      Nose: Nose normal.      Mouth/Throat:      Mouth: Mucous membranes are moist.      Pharynx: Oropharynx is clear.   Eyes:      Extraocular Movements: Extraocular movements intact.      Conjunctiva/sclera: Conjunctivae normal.      Pupils: Pupils are equal, round, and reactive to light.   Cardiovascular:      Rate and Rhythm: " Normal rate and regular rhythm.   Pulmonary:      Effort: Pulmonary effort is normal.      Breath sounds: Normal breath sounds. No wheezing.   Abdominal:      General: There is no distension.      Palpations: Abdomen is soft. There is no mass.      Tenderness: There is no abdominal tenderness.   Musculoskeletal:      Cervical back: Neck supple.      Right lower leg: No edema.      Left lower leg: No edema.   Lymphadenopathy:      Cervical: No cervical adenopathy.   Neurological:      General: No focal deficit present.      Mental Status: She is alert and oriented to person, place, and time.      Gait: Gait normal.   Psychiatric:         Mood and Affect: Mood normal.         Behavior: Behavior normal.         Thought Content: Thought content normal.         Assessment/Plan   Problem List Items Addressed This Visit             ICD-10-CM    Elevated glucose R73.09    Relevant Orders    Hemoglobin A1C    Hemoglobin A1C    Essential hypertension I10    Relevant Medications    losartan (Cozaar) 25 mg tablet    Osteopenia M85.80    Relevant Medications    risedronate (Actonel) 150 mg tablet    Vitamin D deficiency E55.9    Relevant Orders    Vitamin D 25-Hydroxy,Total (for eval of Vitamin D levels)    Vitamin D 25-Hydroxy,Total (for eval of Vitamin D levels)     Other Visit Diagnoses         Codes    Healthcare maintenance    -  Primary Z00.00    Relevant Orders    CBC    Comprehensive Metabolic Panel    Lipid Panel    CBC    Comprehensive Metabolic Panel    Lipid Panel    Vitamin D 25-Hydroxy,Total (for eval of Vitamin D levels)    Encounter for screening mammogram for malignant neoplasm of breast     Z12.31    Relevant Orders    BI mammo bilateral screening tomosynthesis    Vaginal dryness     N89.8    Relevant Medications    estradiol (Yuvafem) 10 mcg tablet vaginal tablet    Hyperlipidemia, unspecified hyperlipidemia type     E78.5    Relevant Medications    rosuvastatin (Crestor) 5 mg tablet                CPE  completed.  Advised to keep a heart healthy, low fat diet with fruits and veggies like Mediterranean diet.  Advised on the importance of exercise and maintaining 150 minutes of exercise per week (30 minutes per day 5 days a week).  Advised on regular eye and dental visits.  Discussed age appropriate cancer screening, immunizations and recommendations given.  Discussed avoiding illicit drugs and tobacco. Advised on appropriate use of alcohol.  Advised to wear seat belt.    Right buttock mass- shows lipoma  -does not bother her     HTN: controlled on Losartan     Fracture of right fibula: seeing ortho  -treated for osteopenia due to this     ELevated glucose: a1c 6.4, watch sugars  -strong fam hx     Hyperlipidemia: on crestor and controlled     Osteopenia: 6 months forteo, on Risedronate     Check labs     Health Maintenance  -Pap smear: normal, >65, no longer needs  -Vaccinations: UTD COVID, shingrix, tdap, flu, prevnar-20  -Mammogram: 2/23, ordered  -Colonoscopy : 3/21-10 years  -DEXA: 7/24-osteopenia

## 2025-02-14 ENCOUNTER — PHARMACY VISIT (OUTPATIENT)
Dept: PHARMACY | Facility: CLINIC | Age: 70
End: 2025-02-14
Payer: COMMERCIAL

## 2025-03-13 ENCOUNTER — HOSPITAL ENCOUNTER (OUTPATIENT)
Dept: RADIOLOGY | Facility: CLINIC | Age: 70
Discharge: HOME | End: 2025-03-13
Payer: COMMERCIAL

## 2025-03-13 VITALS — HEIGHT: 63 IN | BODY MASS INDEX: 27.85 KG/M2 | WEIGHT: 157.2 LBS

## 2025-03-13 DIAGNOSIS — Z12.31 ENCOUNTER FOR SCREENING MAMMOGRAM FOR MALIGNANT NEOPLASM OF BREAST: ICD-10-CM

## 2025-03-13 PROCEDURE — 77063 BREAST TOMOSYNTHESIS BI: CPT | Performed by: RADIOLOGY

## 2025-03-13 PROCEDURE — 77067 SCR MAMMO BI INCL CAD: CPT

## 2025-03-13 PROCEDURE — 77067 SCR MAMMO BI INCL CAD: CPT | Performed by: RADIOLOGY

## 2025-03-14 LAB
25(OH)D3+25(OH)D2 SERPL-MCNC: 61 NG/ML (ref 30–100)
ALBUMIN SERPL-MCNC: 4.6 G/DL (ref 3.6–5.1)
ALP SERPL-CCNC: 58 U/L (ref 37–153)
ALT SERPL-CCNC: 20 U/L (ref 6–29)
ANION GAP SERPL CALCULATED.4IONS-SCNC: 7 MMOL/L (CALC) (ref 7–17)
AST SERPL-CCNC: 19 U/L (ref 10–35)
BILIRUB SERPL-MCNC: 0.6 MG/DL (ref 0.2–1.2)
BUN SERPL-MCNC: 12 MG/DL (ref 7–25)
CALCIUM SERPL-MCNC: 9.6 MG/DL (ref 8.6–10.4)
CHLORIDE SERPL-SCNC: 106 MMOL/L (ref 98–110)
CHOLEST SERPL-MCNC: 183 MG/DL
CHOLEST/HDLC SERPL: 3.3 (CALC)
CO2 SERPL-SCNC: 29 MMOL/L (ref 20–32)
CREAT SERPL-MCNC: 0.85 MG/DL (ref 0.5–1.05)
EGFRCR SERPLBLD CKD-EPI 2021: 75 ML/MIN/1.73M2
ERYTHROCYTE [DISTWIDTH] IN BLOOD BY AUTOMATED COUNT: 13.2 % (ref 11–15)
EST. AVERAGE GLUCOSE BLD GHB EST-MCNC: 131 MG/DL
EST. AVERAGE GLUCOSE BLD GHB EST-SCNC: 7.3 MMOL/L
GLUCOSE SERPL-MCNC: 125 MG/DL (ref 65–99)
HBA1C MFR BLD: 6.2 % OF TOTAL HGB
HCT VFR BLD AUTO: 44.7 % (ref 35–45)
HDLC SERPL-MCNC: 56 MG/DL
HGB BLD-MCNC: 14.2 G/DL (ref 11.7–15.5)
LDLC SERPL CALC-MCNC: 102 MG/DL (CALC)
MCH RBC QN AUTO: 29.8 PG (ref 27–33)
MCHC RBC AUTO-ENTMCNC: 31.8 G/DL (ref 32–36)
MCV RBC AUTO: 93.9 FL (ref 80–100)
NONHDLC SERPL-MCNC: 127 MG/DL (CALC)
PLATELET # BLD AUTO: 271 THOUSAND/UL (ref 140–400)
PMV BLD REES-ECKER: 9.9 FL (ref 7.5–12.5)
POTASSIUM SERPL-SCNC: 5.4 MMOL/L (ref 3.5–5.3)
PROT SERPL-MCNC: 7.2 G/DL (ref 6.1–8.1)
RBC # BLD AUTO: 4.76 MILLION/UL (ref 3.8–5.1)
SODIUM SERPL-SCNC: 142 MMOL/L (ref 135–146)
TRIGL SERPL-MCNC: 145 MG/DL
WBC # BLD AUTO: 6.2 THOUSAND/UL (ref 3.8–10.8)

## 2025-04-30 PROCEDURE — RXMED WILLOW AMBULATORY MEDICATION CHARGE

## 2025-05-01 ENCOUNTER — APPOINTMENT (OUTPATIENT)
Dept: OPHTHALMOLOGY | Facility: CLINIC | Age: 70
End: 2025-05-01
Payer: COMMERCIAL

## 2025-05-01 DIAGNOSIS — H04.201 RIGHT EPIPHORA: Primary | ICD-10-CM

## 2025-05-01 DIAGNOSIS — H04.563 PUNCTAL STENOSIS, ACQUIRED, BILATERAL: ICD-10-CM

## 2025-05-01 DIAGNOSIS — H04.551 NASOLACRIMAL DUCT OBSTRUCTION, ACQUIRED, RIGHT: ICD-10-CM

## 2025-05-01 PROCEDURE — 99214 OFFICE O/P EST MOD 30 MIN: CPT

## 2025-05-01 PROCEDURE — 68810 PROBE NASOLACRIMAL DUCT: CPT | Mod: RIGHT SIDE

## 2025-05-01 ASSESSMENT — REFRACTION_WEARINGRX
OD_AXIS: 140
OS_SPHERE: +1.50
OS_CYLINDER: -0.50
OD_ADD: +2.50
OS_AXIS: 095
OD_SPHERE: -0.25
OS_ADD: +2.50
OD_CYLINDER: -0.75

## 2025-05-01 ASSESSMENT — PACHYMETRY
OS_CT(UM): 572
OD_CT(UM): 567

## 2025-05-01 ASSESSMENT — VISUAL ACUITY
OD_CC: 20/30
OS_CC: 20/30
METHOD: SNELLEN - LINEAR

## 2025-05-01 ASSESSMENT — ENCOUNTER SYMPTOMS
ENDOCRINE NEGATIVE: 0
ALLERGIC/IMMUNOLOGIC NEGATIVE: 0
GASTROINTESTINAL NEGATIVE: 0
RESPIRATORY NEGATIVE: 0
MUSCULOSKELETAL NEGATIVE: 0
HEMATOLOGIC/LYMPHATIC NEGATIVE: 0
PSYCHIATRIC NEGATIVE: 0
CARDIOVASCULAR NEGATIVE: 0
NEUROLOGICAL NEGATIVE: 0
CONSTITUTIONAL NEGATIVE: 0
EYES NEGATIVE: 1

## 2025-05-01 ASSESSMENT — EXTERNAL EXAM - RIGHT EYE: OD_EXAM: NORMAL

## 2025-05-01 ASSESSMENT — TONOMETRY
IOP_METHOD: GOLDMANN APPLANATION
OS_IOP_MMHG: 17
OD_IOP_MMHG: 17

## 2025-05-01 ASSESSMENT — CONF VISUAL FIELD
OS_SUPERIOR_NASAL_RESTRICTION: 0
OS_NORMAL: 1
OD_SUPERIOR_NASAL_RESTRICTION: 0
OS_SUPERIOR_TEMPORAL_RESTRICTION: 0
OD_NORMAL: 1
OS_INFERIOR_TEMPORAL_RESTRICTION: 0
OS_INFERIOR_NASAL_RESTRICTION: 0
OD_INFERIOR_TEMPORAL_RESTRICTION: 0
OD_SUPERIOR_TEMPORAL_RESTRICTION: 0
OD_INFERIOR_NASAL_RESTRICTION: 0

## 2025-05-01 ASSESSMENT — EXTERNAL EXAM - LEFT EYE: OS_EXAM: NORMAL

## 2025-05-01 NOTE — PROGRESS NOTES
Patient with hx of epiphora OD  Had nasolacrimal duct stenting x2016 [facility]  Removed by Dhiraj 2019    States that she was doing well until four months ago when epiphora returned    Since the epiphora returned after having a stent placed, she is interested in other options to address the epiphora.    Hx of deviated septum    Probing and irrigation demonstrated reflux with delayed taste of saline, suggestive of anasolacrimal duct obstruction.    Dacryocystorhinostomy (DCR) is recommended. However, patient would prefer to have Bynum stent placed. Will attempt stenting and monitoring. If unsuccessful, will re-address possibility of dacryocystorhinostomy (DCR).

## 2025-05-05 ENCOUNTER — PHARMACY VISIT (OUTPATIENT)
Dept: PHARMACY | Facility: CLINIC | Age: 70
End: 2025-05-05
Payer: COMMERCIAL

## 2025-05-08 ENCOUNTER — OFFICE VISIT (OUTPATIENT)
Dept: OTOLARYNGOLOGY | Facility: CLINIC | Age: 70
End: 2025-05-08
Payer: COMMERCIAL

## 2025-05-08 VITALS — BODY MASS INDEX: 27.59 KG/M2 | WEIGHT: 157 LBS

## 2025-05-08 DIAGNOSIS — H04.201 RIGHT EPIPHORA: Primary | ICD-10-CM

## 2025-05-08 PROCEDURE — 1160F RVW MEDS BY RX/DR IN RCRD: CPT | Performed by: OTOLARYNGOLOGY

## 2025-05-08 PROCEDURE — 1036F TOBACCO NON-USER: CPT | Performed by: OTOLARYNGOLOGY

## 2025-05-08 PROCEDURE — 99203 OFFICE O/P NEW LOW 30 MIN: CPT | Performed by: OTOLARYNGOLOGY

## 2025-05-08 PROCEDURE — 1159F MED LIST DOCD IN RCRD: CPT | Performed by: OTOLARYNGOLOGY

## 2025-05-08 ASSESSMENT — PUNCTA - ASSESSMENT
OS_PUNCTA: NORMAL
OD_PUNCTA: NORMAL

## 2025-05-08 NOTE — Clinical Note
Saw this patient-- we discussed asking you if you thought it would be feasible to attempt to place a Bynum tube under anesthesia via Hasner's valve and if unsuccessful proceeding with DCR with me (same anesthesia, I would be there on standby) thoughts?

## 2025-05-09 ENCOUNTER — TELEPHONE (OUTPATIENT)
Dept: OTOLARYNGOLOGY | Facility: CLINIC | Age: 70
End: 2025-05-09
Payer: COMMERCIAL

## 2025-05-09 DIAGNOSIS — H04.209 EPIPHORA, UNSPECIFIED LATERALITY: Primary | ICD-10-CM

## 2025-05-09 PROBLEM — H04.201 RIGHT EPIPHORA: Status: ACTIVE | Noted: 2025-05-09

## 2025-05-09 NOTE — TELEPHONE ENCOUNTER
RN left voicemail for patient to return call to Dr. Brewer's office to schedule surgery with Dr. Brewer and Dr. Diallo. Insync message also sent to patient. Encouraged patient to return call at her earliest convenience.

## 2025-05-09 NOTE — H&P (VIEW-ONLY)
Referring Provider:  No referring provider defined for this encounter.      History of Present Illness:  History of Present Illness  The patient is a 70-year-old female who presents for evaluation of epiphora on the right side.    She has been experiencing persistent tearing in her right eye, which was previously managed with a Bynum tube placed in 2016. The stent remained effective for 3 years until it was removed in 2019 upon the recommendation of her general ophthalmologist. Following the removal of the stent, she experienced a period of stability for over 5 years. However, 4 months ago, she began to experience tearing, drainage, and significant irritation in her right eye. She sought consultation from Dr. Diallo, who suggested DCR. Her symptoms are particularly bothersome upon waking, and she reports constant redness, occasional blurry vision, and frequent dabbing of her eye throughout the day. She also notes that the corner of her eye often appears red and irritated. She has a deviated septum to the left she states     ?  Review of Systems:     Review of symptoms was negative except for those stated including Cardiopulmonary, Genitourinary, Gastrointestinal, Psychological, Sleep pattern, Endocrine, Eyes, Neurologic, Musculoskeletal, Skin, Hematologic/Lymphatic and Allergic/Immunologic.     Medical History:     I have reviewed the patient's updated past medical history, surgical history, family history, social history, as well as current medications and allergies as of 5/6/2025. Changes to these items have been updated and marked as reviewed in the electronic medical record.    Physical Exam        Physical Exam:     Vitals:  vitals were not taken for this visit.   General: Patient doing well overall and is in no apparent distress.  Psych: Pleasant affect, and answers questions appropriately.  Head & Face: Symmetric facial movements  Eyes: Pupils equal, round, reactive.  Extraocular movements intact without  gaze restrictions or nystagmus. No epiphora.  Ears:  External auditory canals are normal.  Tympanic membranes are clear.  No middle ear effusion is seen.  All middle ear landmarks are normal.  Nose: Anterior rhinoscopy revealed normal sinonasal mucosa. More posterior areas of the nasal cavity could not be completely examined.  Oral Cavity/Oropharynx:  Without lesions or masses to visual exam.  Neck: Supple without lymphadenopathy.  Lungs: Non-labored, and without evidence of stridor.  Cardiac: Pulses are strong, well-perfused.  Extremities: Without gross evidence of clubbing, cyanosis, or edema.  Neuro: Cranial nerves II-XII grossly intact; Intact facial movements.  Results           Assessment & Plan  1. Right-sided epiphora.  The patient's symptoms suggest a potential blockage in the tear duct system, possibly due to scar tissue formation or anatomical changes. A detailed discussion was held regarding the Dacryocystorhinostomy (DCR) procedure, including its benefits, risks, and success rate. An alternative approach of placing a Bynum tube via Hasner's valve without drilling the lacrimal bone was also discussed, with the option to proceed with DCR if the initial approach is unsuccessful. The patient expressed understanding and agreed to proceed with the plan. The surgery will be scheduled at a convenient time for both the patient and the surgical team.    PROCEDURE  A stent was placed in the right eye by Dr. Smith in 2016 and removed in 2019.          Caden Brewer MD, M.Eng.   of Otolaryngology - Head & Neck Surgery  Division of Rhinology and Endoscopic Skull Base Surgery  [facility]     This medical note was created with the assistance of artificial intelligence (AI) for documentation purposes. The content has been reviewed and confirmed by the healthcare provider for accuracy and completeness. Patient consented  to the use of audio recording and use of AI during their visit.

## 2025-05-09 NOTE — PROGRESS NOTES
Referring Provider:  No referring provider defined for this encounter.      History of Present Illness:  History of Present Illness  The patient is a 70-year-old female who presents for evaluation of epiphora on the right side.    She has been experiencing persistent tearing in her right eye, which was previously managed with a Bynum tube placed in 2016. The stent remained effective for 3 years until it was removed in 2019 upon the recommendation of her general ophthalmologist. Following the removal of the stent, she experienced a period of stability for over 5 years. However, 4 months ago, she began to experience tearing, drainage, and significant irritation in her right eye. She sought consultation from Dr. Diallo, who suggested DCR. Her symptoms are particularly bothersome upon waking, and she reports constant redness, occasional blurry vision, and frequent dabbing of her eye throughout the day. She also notes that the corner of her eye often appears red and irritated. She has a deviated septum to the left she states     ?  Review of Systems:     Review of symptoms was negative except for those stated including Cardiopulmonary, Genitourinary, Gastrointestinal, Psychological, Sleep pattern, Endocrine, Eyes, Neurologic, Musculoskeletal, Skin, Hematologic/Lymphatic and Allergic/Immunologic.     Medical History:     I have reviewed the patient's updated past medical history, surgical history, family history, social history, as well as current medications and allergies as of 5/6/2025. Changes to these items have been updated and marked as reviewed in the electronic medical record.    Physical Exam        Physical Exam:     Vitals:  vitals were not taken for this visit.   General: Patient doing well overall and is in no apparent distress.  Psych: Pleasant affect, and answers questions appropriately.  Head & Face: Symmetric facial movements  Eyes: Pupils equal, round, reactive.  Extraocular movements intact without  gaze restrictions or nystagmus. No epiphora.  Ears:  External auditory canals are normal.  Tympanic membranes are clear.  No middle ear effusion is seen.  All middle ear landmarks are normal.  Nose: Anterior rhinoscopy revealed normal sinonasal mucosa. More posterior areas of the nasal cavity could not be completely examined.  Oral Cavity/Oropharynx:  Without lesions or masses to visual exam.  Neck: Supple without lymphadenopathy.  Lungs: Non-labored, and without evidence of stridor.  Cardiac: Pulses are strong, well-perfused.  Extremities: Without gross evidence of clubbing, cyanosis, or edema.  Neuro: Cranial nerves II-XII grossly intact; Intact facial movements.  Results           Assessment & Plan  1. Right-sided epiphora.  The patient's symptoms suggest a potential blockage in the tear duct system, possibly due to scar tissue formation or anatomical changes. A detailed discussion was held regarding the Dacryocystorhinostomy (DCR) procedure, including its benefits, risks, and success rate. An alternative approach of placing a Bynum tube via Hasner's valve without drilling the lacrimal bone was also discussed, with the option to proceed with DCR if the initial approach is unsuccessful. The patient expressed understanding and agreed to proceed with the plan. The surgery will be scheduled at a convenient time for both the patient and the surgical team.    PROCEDURE  A stent was placed in the right eye by Dr. Smith in 2016 and removed in 2019.          Caden Brewer MD, M.Eng.   of Otolaryngology - Head & Neck Surgery  Division of Rhinology and Endoscopic Skull Base Surgery  [facility]     This medical note was created with the assistance of artificial intelligence (AI) for documentation purposes. The content has been reviewed and confirmed by the healthcare provider for accuracy and completeness. Patient consented  to the use of audio recording and use of AI during their visit.

## 2025-05-11 DIAGNOSIS — H04.551 NASOLACRIMAL DUCT OBSTRUCTION, ACQUIRED, RIGHT: Primary | ICD-10-CM

## 2025-05-11 RX ORDER — POLYMYXIN B SULFATE AND TRIMETHOPRIM 1; 10000 MG/ML; [USP'U]/ML
1 SOLUTION OPHTHALMIC 4 TIMES DAILY
Qty: 10 ML | Refills: 0 | Status: SHIPPED | OUTPATIENT
Start: 2025-05-11 | End: 2025-06-11

## 2025-05-11 RX ORDER — DOXYCYCLINE 100 MG/1
100 CAPSULE ORAL 2 TIMES DAILY
Qty: 28 CAPSULE | Refills: 0 | Status: SHIPPED | OUTPATIENT
Start: 2025-05-11 | End: 2025-05-26

## 2025-05-12 ENCOUNTER — PHARMACY VISIT (OUTPATIENT)
Dept: PHARMACY | Facility: CLINIC | Age: 70
End: 2025-05-12
Payer: COMMERCIAL

## 2025-05-12 PROCEDURE — RXMED WILLOW AMBULATORY MEDICATION CHARGE

## 2025-05-12 NOTE — PROGRESS NOTES
Pt reached out due to symptoms of redness and swelling in the inner corner of the right lower eyelid. Mild pain in this area only with light pressure. No vision loss. Pt sent a photo demonstrating mild subtle redness of the right lower eyelid.     No concern for orbital cellulitis. Possibly related to stagnation of tears OD due to NLD obstruction, which we are planning to correct with upcoming DCR surgery.    Sent rx for doxycycline 100 mg PO BID x 2 weeks and polytrim gtts QID OD x 2 weeks.

## 2025-05-13 NOTE — CPM/PAT H&P
CPM/PAT Evaluation       Name: Lary Stein   /Age: 1955       TELEMEDICINE ENCOUNTER  Patient was interviewed by telephone for preadmission testing perioperative risk assessment prior to surgery.    DATE OF CONSULT: 2025  REFERRING PROVIDER: Dr. Brewer, Dr. 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  In  she had a Bynum tube placed which was effective for 3 years until it was removed in 2019.  She had been stable since the removal of the stent for 5 years until 4 months ago when she started to experience tearing, drainage, and right eye irritation.  She has been referred to preadmission testing in anticipation of an endoscopic dacryocystorhinostomy assisted with IGS, and insertion of lacrimal tube.  Patient with medical history significant for hypertension; hyperlipidemia.  Patient has no other medical complaints at this time, and reports to be in usual state of health without any current or recent illnesses/infections/fevers, or hospitalizations. In the past month, neither the patient nor anyone in the household has had any respiratory illnesses including Covid 19, Influenza; Respiratory Syncytial Virus (RSV), or pneumonia.      ACTIVE PROBLEMS  Problem List[1]     PAST MEDICAL HISTORY  Medical History[2]     SURGICAL HISTORY  Surgical History[3]     ANESTHESIA HISTORY  Denies problems with anesthesia in the past such as PONV, prolonged sedation, awareness, dental damage, aspiration, cardiac arrest, difficult intubation, or unexpected hospital admissions. Denies family history of malignant hyperthermia, or pseudocholinesterase deficiency.     SOCIAL HISTORY  Never smoker; no alcohol or recreational drug use.  Patient states she exercises at least 3 times a week and is able to  do moderate ADLs such as heavy housework (vacuuming, scrubbing floors, laundry), light yard work/gardening, walking up a flight of stairs all without cardiac chest pain or excessive/limiting shortness of breath.  METS >6    FAMILY HISTORY  Family History[4]     ALLERGIES  RX Allergies[5]     MEDICATIONS  Current Medications[6]     REVIEW OF SYSTEMS  Review of Systems   Eyes:         Persistent right eye tearing   All other systems reviewed and are negative.    STOP BANG:  Negative for ROSEANNE    PHYSICAL EXAM  Deferred    AIRWAY EXAM  Deferred    VITALS  No vitals taken for telemedicine visit  BMI Readings from Last 1 Encounters:   05/08/25 27.59 kg/m²      BP Readings from Last 4 Encounters:   02/13/25 110/68   02/01/24 125/65   05/25/23 124/76   01/26/23 142/82        LABS  Lab Results   Component Value Date    WBC 6.2 03/13/2025    HGB 14.2 03/13/2025    HCT 44.7 03/13/2025    MCV 93.9 03/13/2025     03/13/2025      Lab Results   Component Value Date    GLUCOSE 125 (H) 03/13/2025    CALCIUM 9.6 03/13/2025     03/13/2025    K 5.4 (H) 03/13/2025    CO2 29 03/13/2025     03/13/2025    BUN 12 03/13/2025    CREATININE 0.85 03/13/2025      Lab Results   Component Value Date    HGBA1C 6.2 (H) 03/13/2025      Lab Results   Component Value Date    CHOL 183 03/13/2025    CHOL 177 02/29/2024    CHOL 170 12/29/2022     Lab Results   Component Value Date    HDL 56 03/13/2025    HDL 57.1 02/29/2024    HDL 52.2 12/29/2022     Lab Results   Component Value Date    LDLCALC 102 (H) 03/13/2025    LDLCALC 87 02/29/2024     Lab Results   Component Value Date    TRIG 145 03/13/2025    TRIG 166 (H) 02/29/2024    TRIG 170 (H) 12/29/2022     ECG from 09/30/2021  Normal sinus rhythm normal  Normal ECG      ASSESSMENT/PLAN  Right epiphora  Endoscopic dacryocystorhinostomy with IGS, insertion of lacrimal tube      Preoperative instructions reviewed in detail with patient during this encounter. A copy of these instructions has  been unloaded to  [employer] along with a copy sent to either home email address or mailed to home address.  This note was created in part upon personal review of patient's medical records.  Speech recognition transcription software was used in the creation of this note. Despite proofreading, several typographical errors might be present that might affect the meaning of the content.            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    Hypertensive retinopathy of both eyes, grade 1    Mass of soft tissue of thigh    Melanocytic nevi of trunk    Melanocytic nevi of unspecified upper limb, including shoulder    Myalgia    Neoplasm of unspecified behavior of bone, soft tissue, and skin    Onycholysis    Onychomycosis of toenail    Osteopenia    Other benign neoplasm of skin, unspecified    Posterior vitreous detachment, right eye    S/P YAG capsulotomy, right    Vitamin D deficiency    Presbyopia of both eyes    Hyperopia, left    Regular astigmatism of right eye    Epiphora    Right epiphora   [2]   Past Medical History:  Diagnosis Date    Acute atopic conjunctivitis, bilateral 08/02/2016    Allergic conjunctivitis of both eyes    Acute pharyngitis, unspecified 08/19/2013    Sore throat    Age-related nuclear cataract, bilateral 03/10/2017    Cataract, nuclear sclerotic, both eyes    Cerebral cysts 04/28/2017    Arachnoid cyst    Cough, unspecified 02/22/2018    Cough    Cough, unspecified 02/22/2018    Cough    Diplopia 03/10/2017    Monocular diplopia    Encounter for gynecological examination (general) (routine) without abnormal findings 04/28/2017    Visit for routine gyn exam    Encounter for other  preprocedural examination 04/28/2017    Pre-op testing    Encounter for screening mammogram for malignant neoplasm of breast 08/05/2013    Visit for screening mammogram    Hypertensive retinopathy of both eyes, grade 1     Nasal congestion 02/22/2018    Nasal congestion    Other conditions influencing health status 03/10/2017    PSC (posterior subcapsular cataract), bilateral    Other conditions influencing health status 08/02/2016    Blocked lacrimal duct    Other conditions influencing health status     Nephrolithiasis    Other general symptoms and signs 02/22/2018    Flu-like symptoms    Other general symptoms and signs 02/22/2018    Flu-like symptoms    Other intervertebral disc displacement, lumbar region 04/28/2017    Lumbar disc herniation    Other nonspecific abnormal finding of lung field 02/22/2018    Lung nodules    Other nonspecific abnormal finding of lung field 02/22/2018    Lung nodules    Other secondary cataract, right eye 09/12/2019    PCO (posterior capsular opacification), right    Other specified abnormal findings of blood chemistry 04/28/2017    Low vitamin D level    Polyp of colon 04/28/2017    Benign colonic polyp    Radiculopathy, lumbar region 04/28/2017    Lumbar radicular pain    Stenosis of right lacrimal canaliculi 08/02/2016    Obstruction of lacrimal canaliculus of right side    Steroid responder, bilateral 04/28/2017    Steroid responder, bilateral    Tension-type headache, unspecified, intractable 09/29/2021    Acute intractable tension-type headache    Trigger finger, right ring finger 04/28/2017    Trigger ring finger of right hand    Unilateral primary osteoarthritis of first carpometacarpal joint, right hand 04/28/2017    Primary osteoarthritis of first carpometacarpal joint of right hand    Unspecified acute lower respiratory infection 12/09/2021    Acute respiratory infection    Unspecified pterygium of right eye 02/22/2018    Pterygium of right eye   [3]   Past Surgical  History:  Procedure Laterality Date    CATARACT EXTRACTION Right     COLONOSCOPY  04/01/2014    Complete Colonoscopy    HERNIA REPAIR  10/23/2013    Hernia Repair    OTHER SURGICAL HISTORY  03/13/2017    Insert Intraocular Prosthesis; Cataract Prev. Removed Right    OTHER SURGICAL HISTORY  11/21/2013    Sclerosing Single Vein By Injection   [4]   Family History  Problem Relation Name Age of Onset    Breast cancer Mother  86    Macular degeneration Mother      Glaucoma Sister      Diabetes Sister Sister has DM     Cancer Brother Brother had melanoma    [5] No Known Allergies  [6] No current facility-administered medications for this encounter.    Current Outpatient Medications:     calcium carbonate-vit D3-min 600 mg calcium- 400 unit tablet, Take by mouth., Disp: , Rfl:     estradiol (Yuvafem) 10 mcg tablet vaginal tablet, INSERT I TABLET VAGINALLY ONCE A WEEK, Disp: 12 tablet, Rfl: 2    levocetirizine (Xyzal) 5 mg tablet, Take 1 tablet (5 mg) by mouth once daily in the evening., Disp: , Rfl:     losartan (Cozaar) 25 mg tablet, Take 1 tablet (25 mg) by mouth 2 times a day., Disp: 180 tablet, Rfl: 3    risedronate (Actonel) 150 mg tablet, Take 1 tablet (150 mg) by mouth once every month., Disp: 9 tablet, Rfl: 3    rosuvastatin (Crestor) 5 mg tablet, Take 1 tablet (5 mg) by mouth once daily., Disp: 90 tablet, Rfl: 3    doxycycline (Monodox) 100 mg capsule, Take 1 capsule (100 mg) by mouth 2 times a day for 14 days. Take with at least 8 ounces (large glass) of water, do not lie down for 30 minutes after (Patient not taking: Reported on 5/13/2025), Disp: 28 capsule, Rfl: 0    polymyxin B sulf-trimethoprim (Polytrim) ophthalmic solution, Administer 1 drop into the right eye 4 times a day for 14 days., Disp: 10 mL, Rfl: 0

## 2025-05-13 NOTE — PREPROCEDURE INSTRUCTIONS
Pre-Op Instructions &?Checklist       Your surgery has been scheduled at [facility] at [address] [place], [address], [address], in the [facility]. Parking is to the left of the main entrance.      You will be contacted about the time of your surgery the day before your surgery (if your surgery is on a Monday, you will be called the Friday before surgery). If you are unable to answer the phone, a detailed voicemail message will be left. Make sure that your voicemail box is not full so a message can be left. If you have not received a call by 3:00 pm you may call [phone] between the hours of 3:00 and 4:00 pm. Please be available by phone the night before/day of surgery in case there is a change in the schedule which may require you to arrive earlier/later.      ?      ?7 DAYS BEFORE SURGERY STOP THESE MEDICATIONS:       * Multiple Vitamins containing Vitamin E       * Herbal supplements, Fish Oil, garlic pills, turmeric, CoQ enzyme       *Stop taking aspirin, aspirin-containing products, and NSAID's like Advil, Motrin, Aleve, Ibuprofen, Meloxicam, Celecoxib, and Diclofenac. Tylenol is okay to take for pain relief.        *If you are currently taking Coumadin/Warfarin, we will have to coordinate that with your PCP &/or the Anticoagulation Clinic.      THE EVENING BEFORE SURGERY:   Do not eat any food after midnight the night before surgery.    You are permitted to have no more than 4 ounces of clear liquid up to 3 hours before your arrival time.   Clear liquids are liquids you see through such as: water, pulp-free juices like apple or white grape juice, coffee and tea without creamer or milk (sugar is okay); clear soda and sports drinks.        DAY OF SURGERY TAKE THESE MEDICATIONS (if it is not listed, do not take it.)    There are no medications for you to take on the morning of surgery.     ON THE MORNING OF SURGERY:       *Shower either the night before your surgery or the  morning of your surgery       *Do not use moisturizers, creams, lotions or perfume, or make-up.       *Wear comfortable, loose fitting clothing.        *All jewelry and valuables should be left at home.       *Prosthetic devices such as contact lenses, hearing aids, dentures, eyelash extensions, hairpins and body piercings must be removed before surgery. Bring containers for eyeglasses/contacts, dentures, or         hearing aids with you.      ? Diabetics: Please check fasting blood sugars upon waking up. ?If fasting blood sugars are <80ml/dl, please drink 100ml/3oz. of apple juice no later than 2 hours prior to surgery.      ?BRING WITH YOU:        *Photo ID and insurance card       *Current list of medicines and allergies       *Pacemaker/Defibrillator/Heart stent cards       *Copy of your complete Advanced Directive/DHPOA-if applicable      ?SMOKING:       *Quitting smoking can make a huge difference to your health and recovery from surgery. ?       *If you need help with quitting, call [phone].        ALCOHOL:       *No alcoholic beverages for 48 hours before surgery.      ?AFTER OUTPATIENT SURGERY:       *A responsible adult MUST accompany you at the time of discharge and stay with you for 24 hours after your surgery.       *You may NOT drive yourself home after surgery.       *You may use a taxi or ride sharing service ([employer], Uber) to return home ONLY if you are accompanied by a friend or family member       *Instructions for resuming your medications will be provided by your surgeon.      CONTACT SURGEON'S OFFICE IF YOU DEVELOP:       *Fever =/>?100.4 F        *New respiratory symptoms (e.g. cough, shortness of breath, respiratory distress, sore throat)       *Recent loss of taste or smell       *Flu like symptoms such as headache, fatigue or gastrointestinal symptoms       *If you develop any open sores, shingles, burning or painful urination    AND/OR:       *You no longer wish to have the surgery.        *Any other personal circumstances change that may lead to the need to cancel or defer this surgery.       *You were admitted to any hospital within one week of your planned procedure.      ?If you have any questions regarding these preoperative instructions, you may call [phone]. If you have questions regarding you surgical procedure, or post-operative care/recovery please call your surgeon's office.      Link to [facility] Laboratory Services Locations   [email or web address]      Link to [facility]   [email or web address]

## 2025-05-14 DIAGNOSIS — H04.551 NASOLACRIMAL DUCT OBSTRUCTION, ACQUIRED, RIGHT: ICD-10-CM

## 2025-05-14 DIAGNOSIS — J32.8 OTHER CHRONIC SINUSITIS: ICD-10-CM

## 2025-05-14 DIAGNOSIS — H04.201 RIGHT EPIPHORA: ICD-10-CM

## 2025-05-15 ENCOUNTER — HOSPITAL ENCOUNTER (OUTPATIENT)
Dept: RADIOLOGY | Facility: CLINIC | Age: 70
Discharge: HOME | End: 2025-05-15
Payer: COMMERCIAL

## 2025-05-15 DIAGNOSIS — H04.551 NASOLACRIMAL DUCT OBSTRUCTION, ACQUIRED, RIGHT: ICD-10-CM

## 2025-05-15 DIAGNOSIS — H04.201 RIGHT EPIPHORA: ICD-10-CM

## 2025-05-15 PROCEDURE — 70486 CT MAXILLOFACIAL W/O DYE: CPT

## 2025-05-16 ENCOUNTER — ANESTHESIA EVENT (OUTPATIENT)
Dept: OPERATING ROOM | Facility: CLINIC | Age: 70
End: 2025-05-16
Payer: COMMERCIAL

## 2025-05-19 ENCOUNTER — ANESTHESIA (OUTPATIENT)
Dept: OPERATING ROOM | Facility: CLINIC | Age: 70
End: 2025-05-19
Payer: COMMERCIAL

## 2025-05-19 ENCOUNTER — PHARMACY VISIT (OUTPATIENT)
Dept: PHARMACY | Facility: CLINIC | Age: 70
End: 2025-05-19
Payer: COMMERCIAL

## 2025-05-19 ENCOUNTER — HOSPITAL ENCOUNTER (OUTPATIENT)
Facility: CLINIC | Age: 70
Setting detail: OUTPATIENT SURGERY
Discharge: HOME | End: 2025-05-19
Attending: OTOLARYNGOLOGY | Admitting: OTOLARYNGOLOGY
Payer: COMMERCIAL

## 2025-05-19 VITALS
SYSTOLIC BLOOD PRESSURE: 131 MMHG | DIASTOLIC BLOOD PRESSURE: 68 MMHG | HEART RATE: 75 BPM | HEIGHT: 63 IN | BODY MASS INDEX: 28.2 KG/M2 | RESPIRATION RATE: 16 BRPM | WEIGHT: 159.17 LBS | OXYGEN SATURATION: 97 % | TEMPERATURE: 97.2 F

## 2025-05-19 DIAGNOSIS — H04.201 RIGHT EPIPHORA: ICD-10-CM

## 2025-05-19 DIAGNOSIS — H04.209 EPIPHORA, UNSPECIFIED LATERALITY: Primary | ICD-10-CM

## 2025-05-19 PROCEDURE — 2500000004 HC RX 250 GENERAL PHARMACY W/ HCPCS (ALT 636 FOR OP/ED): Mod: JZ | Performed by: ANESTHESIOLOGY

## 2025-05-19 PROCEDURE — 7100000009 HC PHASE TWO TIME - INITIAL BASE CHARGE: Performed by: OTOLARYNGOLOGY

## 2025-05-19 PROCEDURE — 2500000001 HC RX 250 WO HCPCS SELF ADMINISTERED DRUGS (ALT 637 FOR MEDICARE OP): Performed by: OTOLARYNGOLOGY

## 2025-05-19 PROCEDURE — RXMED WILLOW AMBULATORY MEDICATION CHARGE

## 2025-05-19 PROCEDURE — 7100000001 HC RECOVERY ROOM TIME - INITIAL BASE CHARGE: Performed by: OTOLARYNGOLOGY

## 2025-05-19 PROCEDURE — 2720000007 HC OR 272 NO HCPCS: Performed by: OTOLARYNGOLOGY

## 2025-05-19 PROCEDURE — 7100000010 HC PHASE TWO TIME - EACH INCREMENTAL 1 MINUTE: Performed by: OTOLARYNGOLOGY

## 2025-05-19 PROCEDURE — 3700000002 HC GENERAL ANESTHESIA TIME - EACH INCREMENTAL 1 MINUTE: Performed by: OTOLARYNGOLOGY

## 2025-05-19 PROCEDURE — 3600000017 HC OR TIME - EACH INCREMENTAL 1 MINUTE - PROCEDURE LEVEL SIX: Performed by: OTOLARYNGOLOGY

## 2025-05-19 PROCEDURE — 3600000018 HC OR TIME - INITIAL BASE CHARGE - PROCEDURE LEVEL SIX: Performed by: OTOLARYNGOLOGY

## 2025-05-19 PROCEDURE — 2500000004 HC RX 250 GENERAL PHARMACY W/ HCPCS (ALT 636 FOR OP/ED): Performed by: OTOLARYNGOLOGY

## 2025-05-19 PROCEDURE — 2500000004 HC RX 250 GENERAL PHARMACY W/ HCPCS (ALT 636 FOR OP/ED): Performed by: STUDENT IN AN ORGANIZED HEALTH CARE EDUCATION/TRAINING PROGRAM

## 2025-05-19 PROCEDURE — 2500000005 HC RX 250 GENERAL PHARMACY W/O HCPCS: Performed by: OTOLARYNGOLOGY

## 2025-05-19 PROCEDURE — 2500000001 HC RX 250 WO HCPCS SELF ADMINISTERED DRUGS (ALT 637 FOR MEDICARE OP): Performed by: ANESTHESIOLOGY

## 2025-05-19 PROCEDURE — A31239 PR NASAL/SINUS ENDOSCOPY,SURG TEAR DUCT: Performed by: ANESTHESIOLOGY

## 2025-05-19 PROCEDURE — 3700000001 HC GENERAL ANESTHESIA TIME - INITIAL BASE CHARGE: Performed by: OTOLARYNGOLOGY

## 2025-05-19 PROCEDURE — 7100000002 HC RECOVERY ROOM TIME - EACH INCREMENTAL 1 MINUTE: Performed by: OTOLARYNGOLOGY

## 2025-05-19 PROCEDURE — 2500000005 HC RX 250 GENERAL PHARMACY W/O HCPCS: Mod: JZ | Performed by: OTOLARYNGOLOGY

## 2025-05-19 DEVICE — LACRIMAL INTUBATION SET WITH SUTURE CRAWFORD
Type: IMPLANTABLE DEVICE | Site: EYE | Status: FUNCTIONAL
Brand: CRAWFORD

## 2025-05-19 RX ORDER — NEOMYCIN SULFATE, POLYMYXIN B SULFATE AND DEXAMETHASONE 3.5; 10000; 1 MG/ML; [USP'U]/ML; MG/ML
SUSPENSION/ DROPS OPHTHALMIC
Qty: 5 ML | Refills: 1 | Status: SHIPPED | OUTPATIENT
Start: 2025-05-19 | End: 2025-06-16

## 2025-05-19 RX ORDER — METOCLOPRAMIDE HYDROCHLORIDE 5 MG/ML
10 INJECTION INTRAMUSCULAR; INTRAVENOUS EVERY 6 HOURS PRN
Status: DISCONTINUED | OUTPATIENT
Start: 2025-05-19 | End: 2025-05-19 | Stop reason: HOSPADM

## 2025-05-19 RX ORDER — MIDAZOLAM HYDROCHLORIDE 1 MG/ML
INJECTION, SOLUTION INTRAMUSCULAR; INTRAVENOUS AS NEEDED
Status: DISCONTINUED | OUTPATIENT
Start: 2025-05-19 | End: 2025-05-19

## 2025-05-19 RX ORDER — ONDANSETRON HYDROCHLORIDE 2 MG/ML
4 INJECTION, SOLUTION INTRAVENOUS ONCE AS NEEDED
Status: COMPLETED | OUTPATIENT
Start: 2025-05-19 | End: 2025-05-19

## 2025-05-19 RX ORDER — OXYMETAZOLINE HCL 0.05 %
SPRAY, NON-AEROSOL (ML) NASAL AS NEEDED
Status: DISCONTINUED | OUTPATIENT
Start: 2025-05-19 | End: 2025-05-19 | Stop reason: HOSPADM

## 2025-05-19 RX ORDER — PROPOFOL 10 MG/ML
INJECTION, EMULSION INTRAVENOUS AS NEEDED
Status: DISCONTINUED | OUTPATIENT
Start: 2025-05-19 | End: 2025-05-19

## 2025-05-19 RX ORDER — LIDOCAINE HYDROCHLORIDE AND EPINEPHRINE 10; 10 UG/ML; MG/ML
INJECTION, SOLUTION INFILTRATION; PERINEURAL AS NEEDED
Status: DISCONTINUED | OUTPATIENT
Start: 2025-05-19 | End: 2025-05-19 | Stop reason: HOSPADM

## 2025-05-19 RX ORDER — LIDOCAINE HYDROCHLORIDE 20 MG/ML
INJECTION, SOLUTION INFILTRATION; PERINEURAL AS NEEDED
Status: DISCONTINUED | OUTPATIENT
Start: 2025-05-19 | End: 2025-05-19

## 2025-05-19 RX ORDER — FENTANYL CITRATE 50 UG/ML
INJECTION, SOLUTION INTRAMUSCULAR; INTRAVENOUS AS NEEDED
Status: DISCONTINUED | OUTPATIENT
Start: 2025-05-19 | End: 2025-05-19

## 2025-05-19 RX ORDER — ROCURONIUM BROMIDE 10 MG/ML
INJECTION, SOLUTION INTRAVENOUS AS NEEDED
Status: DISCONTINUED | OUTPATIENT
Start: 2025-05-19 | End: 2025-05-19

## 2025-05-19 RX ORDER — TETRACAINE HYDROCHLORIDE 5 MG/ML
SOLUTION OPHTHALMIC AS NEEDED
Status: DISCONTINUED | OUTPATIENT
Start: 2025-05-19 | End: 2025-05-19 | Stop reason: HOSPADM

## 2025-05-19 RX ORDER — FENTANYL CITRATE 50 UG/ML
25 INJECTION, SOLUTION INTRAMUSCULAR; INTRAVENOUS EVERY 5 MIN PRN
Status: DISCONTINUED | OUTPATIENT
Start: 2025-05-19 | End: 2025-05-19 | Stop reason: HOSPADM

## 2025-05-19 RX ORDER — OXYCODONE HYDROCHLORIDE 5 MG/1
5 TABLET ORAL EVERY 4 HOURS PRN
Status: DISCONTINUED | OUTPATIENT
Start: 2025-05-19 | End: 2025-05-19 | Stop reason: HOSPADM

## 2025-05-19 RX ORDER — HYDROMORPHONE HYDROCHLORIDE 1 MG/ML
INJECTION, SOLUTION INTRAMUSCULAR; INTRAVENOUS; SUBCUTANEOUS AS NEEDED
Status: DISCONTINUED | OUTPATIENT
Start: 2025-05-19 | End: 2025-05-19

## 2025-05-19 RX ORDER — CEFAZOLIN 1 G/1
INJECTION, POWDER, FOR SOLUTION INTRAVENOUS AS NEEDED
Status: DISCONTINUED | OUTPATIENT
Start: 2025-05-19 | End: 2025-05-19

## 2025-05-19 RX ORDER — ONDANSETRON HYDROCHLORIDE 2 MG/ML
INJECTION, SOLUTION INTRAVENOUS AS NEEDED
Status: DISCONTINUED | OUTPATIENT
Start: 2025-05-19 | End: 2025-05-19

## 2025-05-19 RX ORDER — LIDOCAINE HYDROCHLORIDE 10 MG/ML
0.1 INJECTION, SOLUTION EPIDURAL; INFILTRATION; INTRACAUDAL; PERINEURAL ONCE
Status: DISCONTINUED | OUTPATIENT
Start: 2025-05-19 | End: 2025-05-19 | Stop reason: HOSPADM

## 2025-05-19 RX ORDER — ACETAMINOPHEN 325 MG/1
650 TABLET ORAL EVERY 4 HOURS PRN
Status: DISCONTINUED | OUTPATIENT
Start: 2025-05-19 | End: 2025-05-19 | Stop reason: HOSPADM

## 2025-05-19 RX ORDER — LABETALOL HYDROCHLORIDE 5 MG/ML
5 INJECTION, SOLUTION INTRAVENOUS ONCE AS NEEDED
Status: DISCONTINUED | OUTPATIENT
Start: 2025-05-19 | End: 2025-05-19 | Stop reason: HOSPADM

## 2025-05-19 RX ORDER — FENTANYL CITRATE 50 UG/ML
50 INJECTION, SOLUTION INTRAMUSCULAR; INTRAVENOUS EVERY 5 MIN PRN
Status: DISCONTINUED | OUTPATIENT
Start: 2025-05-19 | End: 2025-05-19 | Stop reason: HOSPADM

## 2025-05-19 RX ORDER — SODIUM CHLORIDE 0.9 G/100ML
INJECTION, SOLUTION IRRIGATION AS NEEDED
Status: DISCONTINUED | OUTPATIENT
Start: 2025-05-19 | End: 2025-05-19 | Stop reason: HOSPADM

## 2025-05-19 RX ORDER — SODIUM CHLORIDE, SODIUM LACTATE, POTASSIUM CHLORIDE, CALCIUM CHLORIDE 600; 310; 30; 20 MG/100ML; MG/100ML; MG/100ML; MG/100ML
100 INJECTION, SOLUTION INTRAVENOUS CONTINUOUS
Status: DISCONTINUED | OUTPATIENT
Start: 2025-05-19 | End: 2025-05-19 | Stop reason: HOSPADM

## 2025-05-19 RX ADMIN — LIDOCAINE HYDROCHLORIDE 100 MG: 20 INJECTION, SOLUTION INFILTRATION; PERINEURAL at 08:02

## 2025-05-19 RX ADMIN — ACETAMINOPHEN 650 MG: 325 TABLET, FILM COATED ORAL at 10:06

## 2025-05-19 RX ADMIN — HYDROMORPHONE HYDROCHLORIDE 0.4 MG: 1 INJECTION, SOLUTION INTRAMUSCULAR; INTRAVENOUS; SUBCUTANEOUS at 08:52

## 2025-05-19 RX ADMIN — SODIUM CHLORIDE, POTASSIUM CHLORIDE, SODIUM LACTATE AND CALCIUM CHLORIDE: 600; 310; 30; 20 INJECTION, SOLUTION INTRAVENOUS at 07:53

## 2025-05-19 RX ADMIN — ONDANSETRON 4 MG: 2 INJECTION, SOLUTION INTRAMUSCULAR; INTRAVENOUS at 08:10

## 2025-05-19 RX ADMIN — CEFAZOLIN 2 G: 1 INJECTION, POWDER, FOR SOLUTION INTRAMUSCULAR; INTRAVENOUS at 08:08

## 2025-05-19 RX ADMIN — DEXAMETHASONE SODIUM PHOSPHATE 4 MG: 4 INJECTION, SOLUTION INTRAMUSCULAR; INTRAVENOUS at 08:10

## 2025-05-19 RX ADMIN — SUGAMMADEX 200 MG: 100 INJECTION, SOLUTION INTRAVENOUS at 09:06

## 2025-05-19 RX ADMIN — MIDAZOLAM 2 MG: 1 INJECTION INTRAMUSCULAR; INTRAVENOUS at 07:55

## 2025-05-19 RX ADMIN — METOCLOPRAMIDE 10 MG: 5 INJECTION, SOLUTION INTRAMUSCULAR; INTRAVENOUS at 10:02

## 2025-05-19 RX ADMIN — FENTANYL CITRATE 25 MCG: 50 INJECTION, SOLUTION INTRAMUSCULAR; INTRAVENOUS at 08:02

## 2025-05-19 RX ADMIN — ONDANSETRON 4 MG: 2 INJECTION, SOLUTION INTRAMUSCULAR; INTRAVENOUS at 09:30

## 2025-05-19 RX ADMIN — ROCURONIUM BROMIDE 50 MG: 10 INJECTION, SOLUTION INTRAVENOUS at 08:02

## 2025-05-19 RX ADMIN — PROPOFOL 150 MG: 10 INJECTION, EMULSION INTRAVENOUS at 08:02

## 2025-05-19 ASSESSMENT — ENCOUNTER SYMPTOMS
ABDOMINAL DISTENTION: 0
WHEEZING: 0
EYE DISCHARGE: 0
EYE REDNESS: 0
FACIAL SWELLING: 0
FACIAL ASYMMETRY: 0
AGITATION: 0
SEIZURES: 0
STRIDOR: 0
JOINT SWELLING: 0
FEVER: 0
COLOR CHANGE: 0
DIAPHORESIS: 0
RHINORRHEA: 0

## 2025-05-19 ASSESSMENT — COLUMBIA-SUICIDE SEVERITY RATING SCALE - C-SSRS
6. HAVE YOU EVER DONE ANYTHING, STARTED TO DO ANYTHING, OR PREPARED TO DO ANYTHING TO END YOUR LIFE?: NO
2. HAVE YOU ACTUALLY HAD ANY THOUGHTS OF KILLING YOURSELF?: NO
1. IN THE PAST MONTH, HAVE YOU WISHED YOU WERE DEAD OR WISHED YOU COULD GO TO SLEEP AND NOT WAKE UP?: NO

## 2025-05-19 ASSESSMENT — PAIN SCALES - GENERAL
PAINLEVEL_OUTOF10: 0 - NO PAIN
PAINLEVEL_OUTOF10: 3
PAIN_LEVEL: 0
PAINLEVEL_OUTOF10: 2
PAINLEVEL_OUTOF10: 0 - NO PAIN
PAINLEVEL_OUTOF10: 0 - NO PAIN
PAINLEVEL_OUTOF10: 2
PAINLEVEL_OUTOF10: 0 - NO PAIN
PAINLEVEL_OUTOF10: 3

## 2025-05-19 ASSESSMENT — PAIN - FUNCTIONAL ASSESSMENT
PAIN_FUNCTIONAL_ASSESSMENT: 0-10

## 2025-05-19 NOTE — ANESTHESIA PREPROCEDURE EVALUATION
Patient: Lary Stein    Procedure Information       Date/Time: 05/19/25 0715    Procedures:       Endoscopic dacrocystorhinostomy with image guidance - 20515-97      INSERTION, LACRIMAL TUBE - 70492-17    Location: [facility] 03 / [facility]    Surgeons: Caden SALAS MD; Bob Diallo MD            Relevant Problems   Cardiac   (+) Essential hypertension      ID   (+) Cold sore   (+) Onychomycosis of toenail       Clinical information reviewed:   Tobacco  Allergies  Meds  Problems  Med Hx  Surg Hx   Fam Hx  Soc   Hx        NPO Detail:  NPO/Void Status  Carbohydrate Drink Given Prior to Surgery? : N  Date of Last Liquid: 05/18/25  Time of Last Liquid: 1800  Date of Last Solid: 05/18/25  Time of Last Solid: 1800  Last Intake Type: Clear fluids; Food         Physical Exam    Airway  Mallampati: IV  TM distance: >3 FB  Neck ROM: full  Mouth opening: 3 or more finger widths     Cardiovascular    Dental - normal exam     Pulmonary    Abdominal            Anesthesia Plan    History of general anesthesia?: yes  History of complications of general anesthesia?: no    ASA 2     general     intravenous induction   Postoperative pain plan includes opioids.  Trial extubation is planned.  Anesthetic plan and risks discussed with patient.

## 2025-05-19 NOTE — H&P
History Of Present Illness  Lary Stein is a 70 y.o. female presenting with right NLDO obstruction and right epiphora.     Past Medical History  Medical History[1]    Surgical History  Surgical History[2]     Social History  She reports that she has never smoked. She has never used smokeless tobacco. She reports that she does not drink alcohol and does not use drugs.    Family History  Family History[3]     Allergies  Patient has no known allergies.    Review of Systems   Constitutional:  Negative for diaphoresis and fever.   HENT:  Negative for facial swelling, nosebleeds and rhinorrhea.    Eyes:  Negative for discharge and redness.   Respiratory:  Negative for wheezing and stridor.    Cardiovascular:  Negative for leg swelling.   Gastrointestinal:  Negative for abdominal distention.   Musculoskeletal:  Negative for joint swelling.   Skin:  Negative for color change and pallor.   Neurological:  Negative for seizures, syncope and facial asymmetry.   Psychiatric/Behavioral:  Negative for agitation and behavioral problems.         Physical Exam  Constitutional:       Appearance: Normal appearance.   HENT:      Head: Normocephalic and atraumatic.      Nose: No rhinorrhea.   Eyes:      General:         Right eye: No discharge.         Left eye: No discharge.      Conjunctiva/sclera: Conjunctivae normal.   Cardiovascular:      Rate and Rhythm: Normal rate and regular rhythm.   Pulmonary:      Effort: Pulmonary effort is normal. No respiratory distress.      Breath sounds: No stridor.   Abdominal:      General: Abdomen is flat. There is no distension.   Musculoskeletal:         General: No swelling or deformity.   Skin:     General: Skin is warm and dry.   Neurological:      General: No focal deficit present.      Mental Status: She is alert. Mental status is at baseline.   Psychiatric:         Mood and Affect: Mood normal.         Behavior: Behavior normal.          Last Recorded Vitals  Blood pressure 158/82, pulse  "68, temperature 36.8 °C (98.2 °F), temperature source Temporal, resp. rate 16, height 1.6 m (5' 3\"), weight 72.2 kg (159 lb 2.8 oz), SpO2 99%.    Relevant Results             Assessment & Plan  Epiphora    Right epiphora      Proceed with RIGHT planned endoscopic endonasal dacryocystorhinostomy (DCR) in combination with otolaryngology.         Surinder Linn MD         [1]   Past Medical History:  Diagnosis Date    Acute atopic conjunctivitis, bilateral 08/02/2016    Allergic conjunctivitis of both eyes    Acute pharyngitis, unspecified 08/19/2013    Sore throat    Age-related nuclear cataract, bilateral 03/10/2017    Cataract, nuclear sclerotic, both eyes    Cerebral cysts 04/28/2017    Arachnoid cyst    Cough, unspecified 02/22/2018    Cough    Cough, unspecified 02/22/2018    Cough    Diplopia 03/10/2017    Monocular diplopia    Encounter for gynecological examination (general) (routine) without abnormal findings 04/28/2017    Visit for routine gyn exam    Encounter for other preprocedural examination 04/28/2017    Pre-op testing    Encounter for screening mammogram for malignant neoplasm of breast 08/05/2013    Visit for screening mammogram    Hypertensive retinopathy of both eyes, grade 1     Nasal congestion 02/22/2018    Nasal congestion    Other conditions influencing health status 03/10/2017    PSC (posterior subcapsular cataract), bilateral    Other conditions influencing health status 08/02/2016    Blocked lacrimal duct    Other conditions influencing health status     Nephrolithiasis    Other general symptoms and signs 02/22/2018    Flu-like symptoms    Other general symptoms and signs 02/22/2018    Flu-like symptoms    Other intervertebral disc displacement, lumbar region 04/28/2017    Lumbar disc herniation    Other nonspecific abnormal finding of lung field 02/22/2018    Lung nodules    Other nonspecific abnormal finding of lung field 02/22/2018    Lung nodules    Other secondary cataract, right eye " 09/12/2019    PCO (posterior capsular opacification), right    Other specified abnormal findings of blood chemistry 04/28/2017    Low vitamin D level    Polyp of colon 04/28/2017    Benign colonic polyp    Radiculopathy, lumbar region 04/28/2017    Lumbar radicular pain    Stenosis of right lacrimal canaliculi 08/02/2016    Obstruction of lacrimal canaliculus of right side    Steroid responder, bilateral 04/28/2017    Steroid responder, bilateral    Tension-type headache, unspecified, intractable 09/29/2021    Acute intractable tension-type headache    Trigger finger, right ring finger 04/28/2017    Trigger ring finger of right hand    Unilateral primary osteoarthritis of first carpometacarpal joint, right hand 04/28/2017    Primary osteoarthritis of first carpometacarpal joint of right hand    Unspecified acute lower respiratory infection 12/09/2021    Acute respiratory infection    Unspecified pterygium of right eye 02/22/2018    Pterygium of right eye   [2]   Past Surgical History:  Procedure Laterality Date    CATARACT EXTRACTION Right     COLONOSCOPY  04/01/2014    Complete Colonoscopy    HERNIA REPAIR  10/23/2013    Hernia Repair    OTHER SURGICAL HISTORY  03/13/2017    Insert Intraocular Prosthesis; Cataract Prev. Removed Right    OTHER SURGICAL HISTORY  11/21/2013    Sclerosing Single Vein By Injection   [3]   Family History  Problem Relation Name Age of Onset    Breast cancer Mother  86    Macular degeneration Mother      Glaucoma Sister      Diabetes Sister Sister has DM     Cancer Brother Brother had melanoma

## 2025-05-19 NOTE — ANESTHESIA PROCEDURE NOTES
Airway  Date/Time: 5/19/2025 8:05 AM  Reason: elective    Airway not difficult    Staffing  Performed: resident   Authorized by: Farzana Porter MD    Performed by: Mihai Bello MD  Patient location during procedure: OR    Patient Condition  Indications for airway management: anesthesia  Patient position: sniffing  Planned trial extubation  Sedation level: deep     Final Airway Details   Preoxygenated: yes  Final airway type: endotracheal airway  Successful airway: ETT   Successful intubation technique: video laryngoscopy  Endotracheal tube insertion site: oral  Blade: Violeta (Hay S3)  Blade size: #3  ETT size (mm): 7.0  Cormack-Lehane Classification: grade I - full view of glottis  Placement verified by: chest auscultation and capnometry   Measured from: teeth  ETT to teeth (cm): 22  Ventilation between attempts: none  Number of attempts at approach: 1  Number of other approaches attempted: 1    Other AttemptsUnsuccessful attempted airways: oropharyngeal  Unsuccessful attempted endotracheal techniques: direct laryngoscopy      Additional Comments  Initial attempt by DL with Mac 3 blade. Grade IV view, anterior, large floppy epiglottis. Switched immediately to Hay S3 without attempting intubation during DL view. Grade I view obtained with Hay, easy ETT placement.

## 2025-05-19 NOTE — DISCHARGE INSTRUCTIONS
Caden Brewer MD, M.Eng.  [facility]  Department of Otolaryngology-Head & Neck Surgery  Division of Rhinology and Endoscopic Skull Base Surgery  [email or web address]    PHONE NUMBERS:    Emergency: Call 911     Urgent Issues/After Hours: Call [phone] and ask to speak to the ENT resident on-call for Otolaryngology/Dr. Brewer    For regular, routine issues, feel free to call us at the office    WHAT TO DO     You need to follow-up with me at my outpatient clinic on the date provided to you.    Please call us to change date or time. Please keep in mind that the timing of your post-operative visit may affect the success of your surgery.    Call ASAP if you are experiencing any unexpected problems.    Familiarize yourself with these instructions. These instructions should replace any instructions given to you by the hospital. If you have questions, please call me.    Use the recommended medications and treatments as described.    Do not blow your nose until I say it is OK.     Call me with questions.  Also, please call me the day after surgery to let me know how you are doing.    WHAT TO EXPECT    Nasal Discharge & Bleeding  It is common to have mild bleeding and nasal drainage after nasal & sinus surgery.  After surgery, a “drip pad” may have been placed under your nose.  You may remove this at any point and can replace it if necessary, at your discretion.    Pain & Pressure  It is common to experience mild-to-moderate pain and pressure in your face and around your eyes. The pain usually is worst the first day after surgery but can continue for several days. Use your pain medications as described below. For any severe pain uncontrolled by medications, please contact the office or present to the emergency room.    Fatigue  It is common to feel mild to moderate fatigue for 7-10 days after surgery.    Nasal Congestion and Crusting    It is common to  experience worsened nasal congestion after surgery.  This is due to swelling and crusts.  Crusts are essentially scabs and mucus that build up in the nasal passages after surgery. Crusts eventually resolve.  Usually, I will remove some crusts during your postoperative visit.    Nasal irrigation helps to soften and remove scabs. You can start irrigating as soon as tomorrow.     Nasal irrigation kits are available over the counter in the nasal section.  Common brands include SinuCleanse or NeilMed.    ACTIVITY     First 1-2 days after surgery: Plan to rest, but you may start light activities as tolerated.  Days 2 through 10 following surgery:  Light activity only until 10 days after surgery, gradually increase activity.    No extensive bending over for 7 days after surgery.    No lifting over 20 pounds for one week after surgery. No aerobic exercise until I clear you to do so.    You may shower starting 1 day after surgery    Call my office if you experience any of the following:    · Fever higher than 101 F  · Clear, watery nasal discharge  · Any visual changes or marked swelling around the eyes  · Severe headache or neck stiffness  · Brisk bleeding    NUTRITION     Day of surgery    Drink plenty of water / fluids  Eat light snacks as tolerated  It is common for people to have less of an appetite the day of surgery  Day after surgery: Advance your diet as tolerated    MEDICATION SAFETY TIPS    Use medications only as directed in the amounts specified  Avoid aspirin for 10 days.  Avoid fish oil (omega-3/6) and vitamin E for 1 week.  As your pain lessens, you may replace doses of prescription pain medications with acetaminophen (Tylenol).  However, if prescribed Lortab or Percocet (containing acetaminophen), Do Not take doses of prescription pain medications at the same time as Tylenol because this could cause an overdose of Tylenol.  Do Not drive or operate machinery if taking prescription pain medications  Read each  medication label carefully, ask your pharmacist if you have any questions regarding warnings on the label  Do not measure liquid with a kitchen spoon.  There are pediatric measuring devices available at the pharmacy.  Ask for one when you get your prescription filled.   Store all mediations out of reach of children.  Call the Poison Control Center if you or your child takes too much of any medicine or if your child consumes your medication [phone].    Revised 6/2022

## 2025-05-19 NOTE — ANESTHESIA POSTPROCEDURE EVALUATION
Patient: Lary Stein    Procedure Summary       Date: 05/19/25 [facility]    Anesthesia Start: 0753 Anesthesia Stop: 0919    Procedures:       Endoscopic dacrocystorhinostomy with image guidance      INSERTION, LACRIMAL TUBE Diagnosis:       Right epiphora      (Right epiphora [H04.201])    Surgeons: Caden SALAS MD; Bob Diallo MD Responsible Provider: Farzana Porter MD    Anesthesia Type: general ASA Status: 2            Anesthesia Type: general    Vitals Value Taken Time   /85 05/19/25 09:19   Temp 36.2 05/19/25 09:19   Pulse 72 05/19/25 09:19   Resp 14 05/19/25 09:19   SpO2 100% 05/19/25 09:19       Anesthesia Post Evaluation    Patient location during evaluation: PACU  Patient participation: complete - patient cannot participate  Level of consciousness: sleepy but conscious  Pain score: 0  Pain management: adequate  Airway patency: patent  Cardiovascular status: acceptable, blood pressure returned to baseline and hemodynamically stable  Respiratory status: acceptable and face mask  Hydration status: acceptable  Postoperative Nausea and Vomiting: none        No notable events documented.

## 2025-05-20 ENCOUNTER — TELEPHONE (OUTPATIENT)
Facility: CLINIC | Age: 70
End: 2025-05-20
Payer: COMMERCIAL

## 2025-05-20 ASSESSMENT — PAIN SCALES - GENERAL: PAINLEVEL_OUTOF10: 0 - NO PAIN

## 2025-05-20 NOTE — TELEPHONE ENCOUNTER
Called Lary to check on the status of his recovery. Patient is POD 1 s/p Endoscopic dacrocystorhinostomy with image guidance performed by Dr. Brewer and Dr. Diallo on 5/19/25. Patient is doing well, reported some discomfort but tolerable with Tylenol. Patient encouraged to continue saline irrigations. Patient reminded of post op appointment scheduled on 5/27/25. Dr. Brewer updated on patient status and office number left for any further questions, patient may have.

## 2025-05-21 NOTE — OP NOTE
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] Post-op Diagnosis     * Right epiphora [H04.201]     Procedures  Endoscopic dacrocystorhinostomy with image guidance  26010 - GA STRTCTC CPTR ASSTD PX EXTRADURAL CRANIAL    Endoscopic dacrocystorhinostomy with image guidance  09891 - GA NASAL/SINUS NDSC SURG W/DACRYOCYSTORHINOSTOMY    Surgeons   Panel 1:     * Caden Brewer V - Primary  Panel 2:     * Bob Diallo - Primary    Resident/Fellow/Other Assistant:  Surgeons and Role:  * No surgeons found with a matching role *    Staff:   Circulator: Mickie Thompson Person: Angela    Anesthesia Staff: Anesthesiologist: Farzana Porter MD  Anesthesia Resident: Mihai Bello MD    Procedure Summary  Anesthesia: General  ASA: II  Estimated Blood Loss: 10mL  Intra-op Medications:   Administrations occurring from 0715 to 0905 on 25:   Medication Name Total Dose   lidocaine-epinephrine (Xylocaine W/EPI) 1 %-1:100,000 injection 4 mL   sodium chloride 0.9 % irrigation solution 250 mL   oxymetazoline (Afrin) 0.05 % nasal spray 15 mL   tetracaine (Altacaine) 0.5 % ophthalmic solution 2 drop   ceFAZolin (Ancef) 1 g 2 g   dexAMETHasone (Decadron) 4 mg/mL IV Syringe 2 mL 4 mg   fentaNYL (Sublimaze) injection 50 mcg/mL 25 mcg   HYDROmorphone (Dilaudid) injection 1 mg/mL 0.4 mg   LR bolus Cannot be calculated   lidocaine (Xylocaine) injection 2 % 100 mg   midazolam (Versed) injection 1 mg/mL 2 mg   ondansetron (Zofran) 2 mg/mL injection 4 mg   propofol (Diprivan) injection 10 mg/mL 150 mg   rocuronium (ZeMuron) 50 mg/5 mL injection 50 mg              Anesthesia Record               Intraprocedure I/O Totals          Intake    LR bolus 300.00 mL    Total Intake 300 mL          Specimen: No specimens collected       Drains and/or Catheters: * None in log *        Implants:  Implants       Type Name Action Serial No.      Implant INTUBATION SET, LACRIMAL, W/SUTURE, PHILLIPS - [id number] Implanted             Indications: Lary Stein is an 70 y.o. 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     The patient was seen in the preoperative area. The risks, benefits, complications, treatment options, non-operative alternatives, expected recovery and outcomes were discussed with the patient. The possibilities of reaction to medication, pulmonary aspiration, injury to surrounding structures, bleeding, recurrent infection, the need for additional procedures, failure to diagnose a condition, and creating a complication requiring transfusion or operation were discussed with the patient. The patient concurred with the proposed plan, giving informed consent.  The site of surgery was properly noted/marked if necessary per policy. The patient has been actively warmed in preoperative area. Preoperative antibiotics have been ordered and given within 1 hours of incision. Venous thrombosis prophylaxis have been ordered including bilateral sequential compression devices    Procedure Details: Patient was identified in the preoperative holding area and brought to the operating room.  She was laid supine in the operating room table.  Smooth induction of general endotracheal anesthesia was performed.  The bed was rotated 90 degrees.  The patient's nose was decongested with oxymetazoline.  The [employer] image guidance system was registered to the patient's facial landmarks using her preoperative imaging.  It was noted to be functioning within 2 mm of accuracy throughout the case was used to identify pertinent neurovascular and bony landmarks.  A timeout was performed in which the correct patient procedure and other pertinent information were confirmed with the operating room staff.    To begin to visualize the patient's bilateral  sinonasal cavity.  There were no abnormalities on the left side.  There were no abnormalities on the right side.  I outfracture the inferior turbinates and identified the maxillary line and the uncinate process.  I infiltrated 1% lidocaine with 1 100,000 parts epinephrine into the middle turbinate as well as the lateral nasal wall along the lacrimal crest.  I made an incision through the mucosa along the maxillary line and dissected a submucoperiosteal flap overlying the lacrimal sac.  I removed a small portion of the uncinate process in order to improve exposure.  I used the high-speed bur to remove the medial portion of the lacrimal bone which exposed the mucosa of the lacrimal sac.  At this point my oculoplastic surgery colleagues performed dilation of the upper and lower punctum and inserted a Bynum tube into the lacrimal sac.  I made a linear incision from superior to inferior through the mucosa of the lacrimal sac widely marsupialized and brought both ends of the Bynum tube into the nose.  The oculoplastic surgery team tied the Bynum tube and placed it within the nose.  I thoroughly irrigated both sides of the nose with normal saline.  Maxillary sinus was not obstructed and there were no immediate postoperative complications.  All counts were correct x 2 and I was present and actively participated in the entire procedure  Evidence of Infection: No   Complications:  None; patient tolerated the procedure well.    Disposition: PACU - hemodynamically stable.  Condition: stable           Attending Attestation: I was present and scrubbed for the entire procedure.    Caden Brewer V  Phone Number: [phone]

## 2025-05-21 NOTE — OP NOTE
Endoscopic dacrocystorhinostomy with image guidance Operative Note     Date: 2025  OR Location: [facility] OR    Name: Lary Stein, : 1955, Age: 70 y.o., MRN: [id number], Sex: female    Diagnosis  Pre-op Diagnosis      * Right epiphora [H04.201] Post-op Diagnosis     * Right epiphora [H04.201]     Procedures  Endoscopic dacrocystorhinostomy with image guidance  69549 - IN STRTCTC CPTR ASSTD PX EXTRADURAL CRANIAL    Endoscopic dacrocystorhinostomy with image guidance  08627 - IN NASAL/SINUS NDSC SURG W/DACRYOCYSTORHINOSTOMY    INSERTION, LACRIMAL TUBE  94737 - IN PROBE NASOLACRIMAL DUCT W/WO IRRG INSJ TUBE/STNT    INSERTION, LACRIMAL TUBE  57427 - IN NASAL/SINUS NDSC SURG W/DACRYOCYSTORHINOSTOMY      Surgeons   Panel 1:     * Caden Brewer V - Primary  Panel 2:     * Bob Diallo - Primary    Resident/Fellow/Other Assistant:  Surgeons and Role:  * No surgeons found with a matching role *    Staff:   Circulator: Mickie Thompson Person: Angela    Anesthesia Staff: Anesthesiologist: Farzana Porter MD  Anesthesia Resident: Mihai Bello MD    Procedure Summary  Anesthesia: General  ASA: II  Estimated Blood Loss: 5mL  Intra-op Medications:   Administrations occurring from 0715 to 0905 on 25:   Medication Name Total Dose   lidocaine-epinephrine (Xylocaine W/EPI) 1 %-1:100,000 injection 4 mL   sodium chloride 0.9 % irrigation solution 250 mL   oxymetazoline (Afrin) 0.05 % nasal spray 15 mL   tetracaine (Altacaine) 0.5 % ophthalmic solution 2 drop   ceFAZolin (Ancef) 1 g 2 g   dexAMETHasone (Decadron) 4 mg/mL IV Syringe 2 mL 4 mg   fentaNYL (Sublimaze) injection 50 mcg/mL 25 mcg   HYDROmorphone (Dilaudid) injection 1 mg/mL 0.4 mg   LR bolus Cannot be calculated   lidocaine (Xylocaine) injection 2 % 100 mg   midazolam (Versed) injection 1 mg/mL 2 mg   ondansetron (Zofran) 2 mg/mL injection 4 mg   propofol (Diprivan) injection 10 mg/mL 150 mg   rocuronium (ZeMuron) 50 mg/5 mL injection 50 mg               Anesthesia Record               Intraprocedure I/O Totals          Intake    LR bolus 300.00 mL    Total Intake 300 mL          Specimen: No specimens collected    Drains and/or Catheters: * None in log *  Implants:  Implants       Type Name Action Serial No.      Implant INTUBATION SET, LACRIMAL, W/SUTURE, PHILLIPS - [id number] Implanted               Indications: Lary Stein is an 70 y.o. 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 She has previously been managed with serial nasolacrimal stenting with Phillips tubes, which provided only temporary relief and recurred as soon as the Phillips tubes were removed. After extensive discussion both in clinic and thereafter, Lary ultimately decided to pursue DCR surgery for definitive management. She expressed good understanding toward the risks, benefits, and alternatives, and understands that epiphora may still persist and/or recur even after DCR surgery. She wishes to proceed with DCR surgery on the right side.     The patient was seen in the preoperative area. The risks, benefits, complications, treatment options, non-operative alternatives, expected recovery and outcomes were discussed with the patient. The possibilities of reaction to medication, pulmonary aspiration, injury to surrounding structures, bleeding, recurrent infection, the need for additional procedures, failure to diagnose a condition, and creating a complication requiring transfusion or operation were discussed with the patient. The patient concurred with the proposed plan, giving informed consent.  The site of surgery was properly noted/marked if necessary per policy. The patient has been actively warmed in preoperative area. Preoperative antibiotics were given intravenously within 1 hour of the start time. Venous thrombosis prophylaxis are not  indicated.    Procedure Details:   The patient was identified in the preoperative holding area and brought to the operating room.  She was laid supine in the operating room table.  Smooth induction of general endotracheal anesthesia was performed.  The patient's nose was decongested with oxymetazoline.  The patient was prepped and draped in the regular fashion.  The [employer] image guidance system was registered to the patient's landmarks using her preoperative imaging.  A timeout was performed to which correct patient's procedure and other pertinent information were confirmed with the operating room staff.     This surgery was completed jointly with the Otolaryngology team, and the details of their aspect of the surgery are documented separately. Upon endoscopic identification of the lacrimal fossa, the mucosa overlying the lacrimal bone was removed as well as the lacrimal bone abutting the lacrimal sac by the Otolaryngology team using an endoscopic drill. The upper and lower punctum of the right eye were then dilated with a punctal dilator. A Boykin probe was passed into the lower canaliculus and the lacrimal sac was identified and tented for better endonasal visualization. The sac was opened using a sickle knife along its entire vertical length and enlarged. Mild drainage emanated from the lacrimal sac and was suctioned. The osteotomy was deemed to be of appropriate size and the Boykin probe was removed. Bynum tubes were then passed through the upper and lower canaliculi and both stents were exteriorized from the nostril. The stents were cut off, the silicone tubing was tied and allowed to retract back into the nose.     At the closure of surgery, there was no evidence of active bleeding or excessive swelling. The patient tolerated the procedure well and was taken to the recovery room in stable condition.     Evidence of Infection: No   Complications:  None; patient tolerated the procedure well.     Disposition: PACU - hemodynamically stable.  Condition: stable   Attending Attestation: I was present and scrubbed for the entire procedure.    Caden Brewer V  Phone Number: [phone]

## 2025-05-22 ENCOUNTER — APPOINTMENT (OUTPATIENT)
Dept: OTOLARYNGOLOGY | Facility: CLINIC | Age: 70
End: 2025-05-22
Payer: COMMERCIAL

## 2025-05-22 NOTE — PROGRESS NOTES
Phoned pt on POD#3 to check in on them following surgery. Pt reports they are doing well. No significant pain or vision loss. No specific concerns and pt is in overall good spirits.     Will continue with current plans until reassessment at upcoming follow-up visit. Pt advised to call, present to clinic, or present to ED with any acute changes or concerns.

## 2025-05-27 ENCOUNTER — APPOINTMENT (OUTPATIENT)
Facility: CLINIC | Age: 70
End: 2025-05-27
Payer: COMMERCIAL

## 2025-05-27 DIAGNOSIS — H04.551 NASOLACRIMAL DUCT OBSTRUCTION, ACQUIRED, RIGHT: ICD-10-CM

## 2025-05-27 DIAGNOSIS — H04.201 RIGHT EPIPHORA: Primary | ICD-10-CM

## 2025-05-27 PROCEDURE — 31237 NSL/SINS NDSC SURG BX POLYPC: CPT | Performed by: OTOLARYNGOLOGY

## 2025-05-27 PROCEDURE — 1159F MED LIST DOCD IN RCRD: CPT | Performed by: OTOLARYNGOLOGY

## 2025-05-27 PROCEDURE — 99024 POSTOP FOLLOW-UP VISIT: CPT | Performed by: OTOLARYNGOLOGY

## 2025-05-27 PROCEDURE — 1036F TOBACCO NON-USER: CPT | Performed by: OTOLARYNGOLOGY

## 2025-05-27 NOTE — PROGRESS NOTES
Referring Provider:  No referring provider defined for this encounter.      History of Present Illness:  History of Present Illness  The patient presents for postoperative follow-up.    She reports a significant improvement in her epiphora following surgery, estimating a 50% enhancement. She experienced difficulty during the initial two days post-surgery, attributing it to the effects of anesthesia. During this period, she had a sore throat, temporary voice loss, and nausea, all of which have since resolved. Her morning vision is no longer blurry, and she has been performing sinus rinses, which occasionally result in a slight blood-tinged discharge. She has not experienced any pain and only required Tylenol for the first two days post-surgery. She expresses satisfaction with the outcome of the surgery, noting a positive impact on her quality of life. She also reports a sensation of vibration when blowing her nose. She has been using Maxitrol to manage eye inflammation.     ?  Review of Systems:     Review of symptoms was negative except for those stated including Cardiopulmonary, Genitourinary, Gastrointestinal, Psychological, Sleep pattern, Endocrine, Eyes, Neurologic, Musculoskeletal, Skin, Hematologic/Lymphatic and Allergic/Immunologic.     Medical History:     I have reviewed the patient's updated past medical history, surgical history, family history, social history, as well as current medications and allergies as of 5/6/2025. Changes to these items have been updated and marked as reviewed in the electronic medical record.    Physical Exam  Nose: Presence of crusting noted, overall healthy appearance.      Physical Exam:     Vitals:  vitals were not taken for this visit.   General: Patient doing well overall and is in no apparent distress.  Psych: Pleasant affect, and answers questions appropriately.  Head & Face: Symmetric facial movements  Eyes: Pupils equal, round, reactive.  Extraocular movements intact  without gaze restrictions or nystagmus. No epiphora.  Ears:  External auditory canals are normal.  Tympanic membranes are clear.  No middle ear effusion is seen.  All middle ear landmarks are normal.  Nose: Anterior rhinoscopy revealed normal sinonasal mucosa. More posterior areas of the nasal cavity could not be completely examined.  Oral Cavity/Oropharynx:  Without lesions or masses to visual exam.  Neck: Supple without lymphadenopathy.  Lungs: Non-labored, and without evidence of stridor.  Cardiac: Pulses are strong, well-perfused.  Extremities: Without gross evidence of clubbing, cyanosis, or edema.  Neuro: Cranial nerves II-XII grossly intact; Intact facial movements.    Procedure:  Sinonasal cavity debridement (91704)  Pre-procedure diagnosis: Sinonasal crusting/obstruction  Post-procedure diagnosis:  Sinonasal crusting/obstruction  Indication:  Remove nasal crusting, prevent synechiae    Verbal consent was obtained after informed discussion    Topical anesthetic was applied with a combination of 4% lidocaine spray and oxymetazoline. A 0 and 30-degree endoscope was used throughout the procedure.    Blood clots, debris, eschar and nasal crusting were removed from the middle meatus, maxillary sinus and ethmoid cavity.  A combination of straight and curved suctions, as well as grasping forceps were utilized.      Findings:  I meticulously removed crusting from the middle meatus, working around the Bynum tube. Good mobility of the tube noted. No infection.    The patient tolerated the procedure well.      Results           Assessment & Plan  1. Postoperative status following DCR.  Recovery is progressing well with no signs of infection or complications. The surgical procedure was successful, creating a significant opening into the lacrimal sac. There is a reported 50% improvement in tearing and resolution of blurry vision upon waking. Maxitrol is being used to manage inflammation. Continue sinus rinses twice  daily for the next week, then reduce to once daily. Afterward, use rinses as needed. Gentle nose blowing is advised for the remainder of this week, transitioning to normal nose blowing thereafter. Contact the clinic if any issues arise.    Follow-up  Scheduled follow-up in 2 weeks.          Caden Brewer MD, M.Eng.   of Otolaryngology - Head & Neck Surgery  Division of Rhinology and Endoscopic Skull Base Surgery  [facility]     This medical note was created with the assistance of artificial intelligence (AI) for documentation purposes. The content has been reviewed and confirmed by the healthcare provider for accuracy and completeness. Patient consented to the use of audio recording and use of AI during their visit.

## 2025-06-03 ENCOUNTER — APPOINTMENT (OUTPATIENT)
Facility: CLINIC | Age: 70
End: 2025-06-03
Payer: COMMERCIAL

## 2025-06-05 ENCOUNTER — APPOINTMENT (OUTPATIENT)
Dept: OPHTHALMOLOGY | Facility: CLINIC | Age: 70
End: 2025-06-05
Payer: COMMERCIAL

## 2025-06-09 ENCOUNTER — PHARMACY VISIT (OUTPATIENT)
Dept: PHARMACY | Facility: CLINIC | Age: 70
End: 2025-06-09
Payer: COMMERCIAL

## 2025-06-09 PROCEDURE — RXMED WILLOW AMBULATORY MEDICATION CHARGE

## 2025-06-17 ENCOUNTER — APPOINTMENT (OUTPATIENT)
Facility: CLINIC | Age: 70
End: 2025-06-17
Payer: COMMERCIAL

## 2025-06-17 DIAGNOSIS — H04.551 NASOLACRIMAL DUCT OBSTRUCTION, ACQUIRED, RIGHT: ICD-10-CM

## 2025-06-17 DIAGNOSIS — J30.9 ALLERGIC RHINITIS, UNSPECIFIED SEASONALITY, UNSPECIFIED TRIGGER: ICD-10-CM

## 2025-06-17 DIAGNOSIS — H04.201 RIGHT EPIPHORA: Primary | ICD-10-CM

## 2025-06-17 PROCEDURE — 99213 OFFICE O/P EST LOW 20 MIN: CPT | Performed by: OTOLARYNGOLOGY

## 2025-06-17 PROCEDURE — 1160F RVW MEDS BY RX/DR IN RCRD: CPT | Performed by: OTOLARYNGOLOGY

## 2025-06-17 PROCEDURE — 1159F MED LIST DOCD IN RCRD: CPT | Performed by: OTOLARYNGOLOGY

## 2025-06-17 PROCEDURE — 1036F TOBACCO NON-USER: CPT | Performed by: OTOLARYNGOLOGY

## 2025-06-18 NOTE — PROGRESS NOTES
Referring Provider:  No referring provider defined for this encounter.      History of Present Illness:  History of Present Illness  The patient presents for follow up for right sided epiphora    She reports a significant improvement in her condition, with a 50% reduction in tearing compared to the her pre-operative state. He is scheduled to see Dr. Diallo in 2 days. She has been performing sinus rinses once daily, which he believes have been beneficial. She does not experience any congestion or bleeding. She also mentions that she has allergies, which are unrelated to her current condition and typically manifest during the spring season.     ?  Review of Systems:     Review of symptoms was negative except for those stated including Cardiopulmonary, Genitourinary, Gastrointestinal, Psychological, Sleep pattern, Endocrine, Eyes, Neurologic, Musculoskeletal, Skin, Hematologic/Lymphatic and Allergic/Immunologic.     Medical History:     I have reviewed the patient's updated past medical history, surgical history, family history, social history, as well as current medications and allergies as of 5/6/2025. Changes to these items have been updated and marked as reviewed in the electronic medical record.     Physical Exam:  Physical Exam  General: Patient doing well overall and is in no apparent distress.  Vital signs: Vitals were not taken for this visit.  Psych: Pleasant affect, and answers questions appropriately.  Head & Face: Symmetric facial movements.  Eyes: Pupils equal, round, reactive. Extraocular movements intact without gaze restrictions or nystagmus. No epiphora.  Ears: External auditory canals are normal. Tympanic membranes are clear. No middle ear effusion is seen. All middle ear landmarks are normal.  Nose: Unable to visualize the post-operative site  Oral Cavity/Oropharynx: Without lesions or masses to visual exam.  Neck: Supple without lymphadenopathy.  Lungs: Non-labored, and without evidence of  stridor.  Cardiac: Pulses are strong, well-perfused.  Extremities: Without gross evidence of clubbing, cyanosis, or edema.  Neuro: Cranial nerves II-XII grossly intact; Intact facial movements.     Procedure:  Rigid nasal endoscopy (20680)  Pre-procedure diagnosis/Indication for procedure:  To evaluate areas not visualized on anterior rhinoscopy   Anesthesia:  None  Description:  A 0-degree 3-mm rigid nasal endoscope was used to examine the left and right nasal cavities.  The nasal valve areas were examined for abnormalities or collapse.  The inferior and middle turbinates were evaluated.  The middle and superior meatuses, and the sphenoethmoid recesses were examined and inspected for mucopurulence and polyps. Once the endoscope was withdrawn, the patient was noted to have tolerated the procedure well without complications and was returned to ambulatory status.    Findings:    I removed crusting and other material from around the Bynum tube on the right side. It is freely mobile and there was no obstruction noted after debridement. She tolerated the procedure well. No other sinonasal abnormalities.        Assessment & Plan  Right nasolacrimal duct stenosis    Overall doing well-- advised her she may have a variable course until the Bynum tube is removed. Continue rinsing for now.     Follow-up  Follow-up in 2 months.     Caden Brewer MD, M.Eng.   of Otolaryngology - Head & Neck Surgery  Division of Rhinology and Endoscopic Skull Base Surgery  [facility]     This medical note was created with the assistance of artificial intelligence (AI) for documentation purposes. The content has been reviewed and confirmed by the healthcare provider for accuracy and completeness. Patient consented to the use of audio recording and use of AI during their visit.

## 2025-06-19 ENCOUNTER — APPOINTMENT (OUTPATIENT)
Dept: OPHTHALMOLOGY | Facility: CLINIC | Age: 70
End: 2025-06-19
Payer: COMMERCIAL

## 2025-06-19 DIAGNOSIS — H04.551 NASOLACRIMAL DUCT OBSTRUCTION, ACQUIRED, RIGHT: Primary | ICD-10-CM

## 2025-06-19 PROCEDURE — 99024 POSTOP FOLLOW-UP VISIT: CPT

## 2025-06-19 ASSESSMENT — ENCOUNTER SYMPTOMS
CONSTITUTIONAL NEGATIVE: 0
NEUROLOGICAL NEGATIVE: 0
HEMATOLOGIC/LYMPHATIC NEGATIVE: 0
RESPIRATORY NEGATIVE: 0
EYES NEGATIVE: 1
CARDIOVASCULAR NEGATIVE: 0
ENDOCRINE NEGATIVE: 0
ALLERGIC/IMMUNOLOGIC NEGATIVE: 0
GASTROINTESTINAL NEGATIVE: 0
PSYCHIATRIC NEGATIVE: 0
MUSCULOSKELETAL NEGATIVE: 0

## 2025-06-19 ASSESSMENT — PACHYMETRY
OS_CT(UM): 572
OD_CT(UM): 567

## 2025-06-19 ASSESSMENT — VISUAL ACUITY
OS_SC: 20/30
OD_SC: 20/25
METHOD: SNELLEN - LINEAR

## 2025-06-19 ASSESSMENT — TONOMETRY
OD_IOP_MMHG: 18
IOP_METHOD: GOLDMANN APPLANATION
OS_IOP_MMHG: 18

## 2025-06-19 ASSESSMENT — PUNCTA - ASSESSMENT
OS_PUNCTA: NORMAL
OD_PUNCTA: NORMAL

## 2025-06-19 ASSESSMENT — EXTERNAL EXAM - LEFT EYE: OS_EXAM: NORMAL

## 2025-06-19 ASSESSMENT — EXTERNAL EXAM - RIGHT EYE: OD_EXAM: NORMAL

## 2025-06-19 NOTE — PROGRESS NOTES
S/P R endonasal DCR 5/19/25 with insertion of Bynum stent (Daniella, Gostimir)    Pt reports ~50% improvement in tearing. Understands that symptoms will fluctuate while stent is in position. C/o pressure-like sensation OD when looking left, but tube appears to be in good position (might just be from K contact when OD is adducted). No pain.     Overall improved from tearing and in the erythematous skin changes previously described prior to surgery    No concern for cellulitis  Tube in good position    Still  completing maxitrol gtts taper  Pt instructed to contact me if symptoms recur after d/c of gtts    Otherwise RTC in 2 months for POM3 visit. Sooner with any acute changes or worsening

## 2025-06-24 ENCOUNTER — OFFICE VISIT (OUTPATIENT)
Facility: CLINIC | Age: 70
End: 2025-06-24
Payer: COMMERCIAL

## 2025-06-24 VITALS — BODY MASS INDEX: 27.96 KG/M2 | WEIGHT: 157.8 LBS | HEIGHT: 63 IN

## 2025-06-24 DIAGNOSIS — H04.551 NASOLACRIMAL DUCT OBSTRUCTION, ACQUIRED, RIGHT: Primary | ICD-10-CM

## 2025-06-24 PROCEDURE — 3008F BODY MASS INDEX DOCD: CPT | Performed by: OTOLARYNGOLOGY

## 2025-06-24 PROCEDURE — 1159F MED LIST DOCD IN RCRD: CPT | Performed by: OTOLARYNGOLOGY

## 2025-06-24 PROCEDURE — 31231 NASAL ENDOSCOPY DX: CPT | Performed by: OTOLARYNGOLOGY

## 2025-06-24 PROCEDURE — 1036F TOBACCO NON-USER: CPT | Performed by: OTOLARYNGOLOGY

## 2025-06-24 PROCEDURE — 1160F RVW MEDS BY RX/DR IN RCRD: CPT | Performed by: OTOLARYNGOLOGY

## 2025-06-24 PROCEDURE — 99024 POSTOP FOLLOW-UP VISIT: CPT | Performed by: OTOLARYNGOLOGY

## 2025-06-24 ASSESSMENT — PATIENT HEALTH QUESTIONNAIRE - PHQ9
SUM OF ALL RESPONSES TO PHQ9 QUESTIONS 1 AND 2: 0
2. FEELING DOWN, DEPRESSED OR HOPELESS: NOT AT ALL
1. LITTLE INTEREST OR PLEASURE IN DOING THINGS: NOT AT ALL

## 2025-06-24 NOTE — PROGRESS NOTES
History of Present Illness    Lary Stein is a 70 y.o. female who is seen at the request of my partner.  She had surgery for a blocked right sided lacrimal duct approximately a month ago.  This morning suddenly she realized that the tube was protruding into her right eye.  She is here today to have this addressed.      Physical Exam    On examination the lacrimal tube is really protruding significantly.  Under Xylocaine and Wilmar-Synephrine the right sided nasal endoscopy was carried out.  The tubing was identified and it was gently pulled.  The redundant tubing in the medial canthus disappeared.    Assessment and Plan    Recent surgery for a blocked right sided lacrimal duct.  The tubing was protruding into the right eye and was repositioned intranasally.  She is to follow-up with my partner.

## 2025-07-23 ENCOUNTER — PHARMACY VISIT (OUTPATIENT)
Dept: PHARMACY | Facility: CLINIC | Age: 70
End: 2025-07-23
Payer: COMMERCIAL

## 2025-07-23 DIAGNOSIS — H04.201 RIGHT EPIPHORA: ICD-10-CM

## 2025-07-23 DIAGNOSIS — H04.551 NASOLACRIMAL DUCT OBSTRUCTION, ACQUIRED, RIGHT: ICD-10-CM

## 2025-07-23 DIAGNOSIS — H04.201 RIGHT EPIPHORA: Primary | ICD-10-CM

## 2025-07-23 PROCEDURE — RXMED WILLOW AMBULATORY MEDICATION CHARGE

## 2025-07-23 RX ORDER — NEOMYCIN SULFATE, POLYMYXIN B SULFATE AND DEXAMETHASONE 3.5; 10000; 1 MG/ML; [USP'U]/ML; MG/ML
SUSPENSION/ DROPS OPHTHALMIC
Qty: 5 ML | Refills: 1 | Status: CANCELLED | OUTPATIENT
Start: 2025-07-23 | End: 2025-08-20

## 2025-07-23 RX ORDER — NEOMYCIN SULFATE, POLYMYXIN B SULFATE AND DEXAMETHASONE 3.5; 10000; 1 MG/ML; [USP'U]/ML; MG/ML
SUSPENSION/ DROPS OPHTHALMIC
Qty: 5 ML | Refills: 0 | Status: SHIPPED | OUTPATIENT
Start: 2025-07-23 | End: 2025-08-20

## 2025-07-23 NOTE — PROGRESS NOTES
Pt reached out today to notify me of clear tearing OD x 10 days, sometimes sticky white discharge throughout the day. No pain or redness. Tube appears to be in place.    Offered immediate appointment today in my clinic, but pt unable to come in and is travelling out of town tomorrow early in the morning.    Rx sent for maxitrol gtts taper.    Will arrange appointment in 2 weeks (per pt request).

## 2025-07-28 PROCEDURE — RXMED WILLOW AMBULATORY MEDICATION CHARGE

## 2025-07-29 ENCOUNTER — PHARMACY VISIT (OUTPATIENT)
Dept: PHARMACY | Facility: CLINIC | Age: 70
End: 2025-07-29
Payer: COMMERCIAL

## 2025-08-07 ENCOUNTER — APPOINTMENT (OUTPATIENT)
Dept: OPHTHALMOLOGY | Facility: CLINIC | Age: 70
End: 2025-08-07
Payer: COMMERCIAL

## 2025-08-07 DIAGNOSIS — H04.201 RIGHT EPIPHORA: ICD-10-CM

## 2025-08-07 DIAGNOSIS — H04.551 NASOLACRIMAL DUCT OBSTRUCTION, ACQUIRED, RIGHT: Primary | ICD-10-CM

## 2025-08-07 PROCEDURE — 99024 POSTOP FOLLOW-UP VISIT: CPT

## 2025-08-07 ASSESSMENT — VISUAL ACUITY
METHOD: SNELLEN - LINEAR
OS_CC: 20/25
OD_CC: 20/25

## 2025-08-07 ASSESSMENT — CONF VISUAL FIELD
OD_SUPERIOR_TEMPORAL_RESTRICTION: 0
OD_SUPERIOR_NASAL_RESTRICTION: 0
OS_NORMAL: 1
OS_SUPERIOR_NASAL_RESTRICTION: 0
OS_INFERIOR_TEMPORAL_RESTRICTION: 0
OD_INFERIOR_TEMPORAL_RESTRICTION: 0
OD_INFERIOR_NASAL_RESTRICTION: 0
OS_SUPERIOR_TEMPORAL_RESTRICTION: 0
OS_INFERIOR_NASAL_RESTRICTION: 0
OD_NORMAL: 1

## 2025-08-07 ASSESSMENT — EXTERNAL EXAM - RIGHT EYE: OD_EXAM: NORMAL

## 2025-08-07 ASSESSMENT — PACHYMETRY
OS_CT(UM): 572
OD_CT(UM): 567

## 2025-08-07 ASSESSMENT — PUNCTA - ASSESSMENT
OS_PUNCTA: NORMAL
OD_PUNCTA: NORMAL

## 2025-08-07 ASSESSMENT — ENCOUNTER SYMPTOMS: EYES NEGATIVE: 1

## 2025-08-07 ASSESSMENT — TONOMETRY
IOP_METHOD: GOLDMANN APPLANATION
OD_IOP_MMHG: 18
OS_IOP_MMHG: 18

## 2025-08-07 ASSESSMENT — EXTERNAL EXAM - LEFT EYE: OS_EXAM: NORMAL

## 2025-08-07 NOTE — PROGRESS NOTES
S/P R endonasal DCR 5/19/25 with insertion of Bynum stent (Daniella, Imani)    Pt reached out to me directly on 7/23 (see documentation) due to tearing and sticky white discharge OD that had increased from baseline. Offered an apmnt at that time but she was working/travelling and unable to come in for an assessment. I therefore recommended resumption of maxitrol gtts OD which she has been using BID since then.    Today, pt reports that sticky discharge OD is improved, but still has tearing OD that she reports is ~10% improved from preop (previously reported ~50% improvement). No pain. No longer has FBS from tube. No pain or vision loss or diplopia.    Exam today is wnl. Tube is in good position. K clear OU without staining or dryness. EOM full OU. No periocular edema/erythema.    Case discussed with Dr. Brewer. We are nearing the POM3 ricardo and therefore I offered to remove the tube today, but she prefers to hold off until next visit on 9/4/25.    Recommended restarting maxitrol gtts taper ([id number], q1week). Pt states she does not need refill. IOP normal and symmetric today.     RTC 9/4/25 for reassessment and likely removal of tube. Will likely extend maxitrol gtts past that date.    Pt has my personal contact info and has been encouraged to reach out immediately with any acute changes or concerns.      Pt reports ~50% improvement in tearing. Understands that symptoms will fluctuate while stent is in position. C/o pressure-like sensation OD when looking left, but tube appears to be in good position (might just be from K contact when OD is adducted). No pain.     Overall improved from tearing and in the erythematous skin changes previously described prior to surgery    No concern for cellulitis  Tube in good position    Still  completing maxitrol gtts taper  Pt instructed to contact me if symptoms recur after d/c of gtts    Otherwise RTC in 2 months for POM3 visit. Sooner with any acute changes or  worsening      Restart taper  Kept tube in today

## 2025-08-14 PROCEDURE — RXMED WILLOW AMBULATORY MEDICATION CHARGE

## 2025-08-15 ENCOUNTER — PHARMACY VISIT (OUTPATIENT)
Dept: PHARMACY | Facility: CLINIC | Age: 70
End: 2025-08-15
Payer: COMMERCIAL

## 2025-08-28 PROCEDURE — RXMED WILLOW AMBULATORY MEDICATION CHARGE

## 2025-08-29 ENCOUNTER — PHARMACY VISIT (OUTPATIENT)
Dept: PHARMACY | Facility: CLINIC | Age: 70
End: 2025-08-29
Payer: COMMERCIAL

## 2025-09-04 ENCOUNTER — APPOINTMENT (OUTPATIENT)
Dept: OPHTHALMOLOGY | Facility: CLINIC | Age: 70
End: 2025-09-04
Payer: COMMERCIAL

## 2025-09-04 PROCEDURE — RXMED WILLOW AMBULATORY MEDICATION CHARGE

## 2025-09-04 ASSESSMENT — VISUAL ACUITY
OD_CC+: -2
OD_CC: 20/20
METHOD: SNELLEN - LINEAR
OS_CC: 20/25
OS_CC+: -2
CORRECTION_TYPE: GLASSES

## 2025-09-04 ASSESSMENT — EXTERNAL EXAM - LEFT EYE: OS_EXAM: NORMAL

## 2025-09-04 ASSESSMENT — TONOMETRY
IOP_METHOD: GOLDMANN APPLANATION
OD_IOP_MMHG: 20
OS_IOP_MMHG: 20

## 2025-09-04 ASSESSMENT — ENCOUNTER SYMPTOMS
MUSCULOSKELETAL NEGATIVE: 0
EYES NEGATIVE: 1
ENDOCRINE NEGATIVE: 0
ALLERGIC/IMMUNOLOGIC NEGATIVE: 0
PSYCHIATRIC NEGATIVE: 0
RESPIRATORY NEGATIVE: 0
HEMATOLOGIC/LYMPHATIC NEGATIVE: 0
CARDIOVASCULAR NEGATIVE: 0
CONSTITUTIONAL NEGATIVE: 0
GASTROINTESTINAL NEGATIVE: 0
NEUROLOGICAL NEGATIVE: 0

## 2025-09-04 ASSESSMENT — PACHYMETRY
OD_CT(UM): 567
OS_CT(UM): 572

## 2025-09-04 ASSESSMENT — EXTERNAL EXAM - RIGHT EYE: OD_EXAM: NORMAL

## 2025-09-05 ENCOUNTER — PHARMACY VISIT (OUTPATIENT)
Dept: PHARMACY | Facility: CLINIC | Age: 70
End: 2025-09-05
Payer: COMMERCIAL

## 2025-09-11 ENCOUNTER — APPOINTMENT (OUTPATIENT)
Dept: SURGERY | Facility: CLINIC | Age: 70
End: 2025-09-11
Payer: COMMERCIAL

## 2025-11-15 ENCOUNTER — APPOINTMENT (OUTPATIENT)
Dept: OPHTHALMOLOGY | Facility: CLINIC | Age: 70
End: 2025-11-15
Payer: COMMERCIAL

## 2026-01-08 ENCOUNTER — APPOINTMENT (OUTPATIENT)
Dept: OPHTHALMOLOGY | Facility: CLINIC | Age: 71
End: 2026-01-08
Payer: COMMERCIAL

## 2026-02-12 ENCOUNTER — APPOINTMENT (OUTPATIENT)
Dept: PRIMARY CARE | Facility: CLINIC | Age: 71
End: 2026-02-12
Payer: COMMERCIAL

## (undated) DEVICE — TOWEL, SURGICAL, NEURO, O/R, 16 X 26, BLUE, STERILE

## (undated) DEVICE — ELECTRODE, ELECTROSURGICAL, COAGULATOR, W/SUCTION, HANDSWITCHING, 8 FR, 6 IN

## (undated) DEVICE — COVER, LIGHT HANDLE, SURGICAL, FLEXIBLE, DISPOSABLE, STERILE

## (undated) DEVICE — SYRINGE, 3 CC, LUER LOCK

## (undated) DEVICE — Device

## (undated) DEVICE — BUR, TAPERED DIAMOND 15DEGREE

## (undated) DEVICE — DRESSING, GAUZE, SPONGE, 12 PLY, CURITY, 4 X 4 IN, STERILE

## (undated) DEVICE — GOWN, ASTOUND, XL

## (undated) DEVICE — CORD, CAUTERY, BIOPOLAR FORCEP, 12FT

## (undated) DEVICE — COVER HANDLE LIGHT, STERIS, BLUE, STERILE

## (undated) DEVICE — TRACKER, INSTRUMENT

## (undated) DEVICE — CONTAINER, SPECIMEN, 120 ML, STERILE

## (undated) DEVICE — INSTRUMENT, SUCTION, FRAZIER, 8 FR, W/VENT

## (undated) DEVICE — DRESSING, TRANSPARENT, TEGADERM, 2-3/8 X 2-3/4 IN

## (undated) DEVICE — APPLICATOR, COTTON TIP, 3 IN, WOOD, STERILE

## (undated) DEVICE — DRAPE, INSTRUMENT, W/POUCH, STERI DRAPE, 7 X 11 IN, DISPOSABLE, STERILE

## (undated) DEVICE — GLOVE, SURGICAL, PROTEXIS PI , 7.5, PF, LF

## (undated) DEVICE — TUBE, SALEM SUMP, 16 FR X 48IN, ENFIT

## (undated) DEVICE — TRACKER, STINGRAY, NON-INVASIVE, AXIEM STEALTH NAVIGATION

## (undated) DEVICE — DRAPE, FLUID WARMER

## (undated) DEVICE — TRAP, SPECIMEN, NEPTUNE QUICK COLLECTOR

## (undated) DEVICE — SUTURE, VICRYL, 7-0, 18 IN, TG1408, DA, VIOLET

## (undated) DEVICE — DRESSING, GAUZE, SPONGE, 12 PLY, 4 X 4 IN, PLASTIC POUCH, STRL 10PK

## (undated) DEVICE — SUTURE, CHROMIC, 4-0, 18 IN, PS2, BROWN